# Patient Record
Sex: FEMALE | Race: WHITE | NOT HISPANIC OR LATINO | Employment: OTHER | URBAN - METROPOLITAN AREA
[De-identification: names, ages, dates, MRNs, and addresses within clinical notes are randomized per-mention and may not be internally consistent; named-entity substitution may affect disease eponyms.]

---

## 2023-03-14 ENCOUNTER — HOSPITAL ENCOUNTER (INPATIENT)
Facility: HOSPITAL | Age: 78
LOS: 2 days | Discharge: HOME | End: 2023-03-16
Attending: EMERGENCY MEDICINE | Admitting: EMERGENCY MEDICINE

## 2023-03-14 ENCOUNTER — HOSPITAL ENCOUNTER (EMERGENCY)
Facility: HOSPITAL | Age: 78
End: 2023-03-14
Attending: EMERGENCY MEDICINE | Admitting: EMERGENCY MEDICINE

## 2023-03-14 VITALS
WEIGHT: 150 LBS | TEMPERATURE: 97.4 F | OXYGEN SATURATION: 97 % | DIASTOLIC BLOOD PRESSURE: 84 MMHG | SYSTOLIC BLOOD PRESSURE: 145 MMHG | HEIGHT: 65 IN | HEART RATE: 116 BPM | BODY MASS INDEX: 24.99 KG/M2 | RESPIRATION RATE: 21 BRPM

## 2023-03-14 DIAGNOSIS — F10.20 ALCOHOL USE DISORDER, SEVERE, DEPENDENCE (HCC): ICD-10-CM

## 2023-03-14 DIAGNOSIS — F10.20 ALCOHOL DEPENDENCE (HCC): Primary | ICD-10-CM

## 2023-03-14 PROBLEM — F10.939 ALCOHOL WITHDRAWAL SYNDROME WITH COMPLICATION (HCC): Status: ACTIVE | Noted: 2023-03-14

## 2023-03-14 PROBLEM — R26.2 AMBULATORY DYSFUNCTION: Status: ACTIVE | Noted: 2023-03-14

## 2023-03-14 PROBLEM — F41.9 ANXIETY AND DEPRESSION: Status: ACTIVE | Noted: 2023-03-14

## 2023-03-14 PROBLEM — M81.0 OSTEOPOROSIS: Status: ACTIVE | Noted: 2023-03-14

## 2023-03-14 PROBLEM — F32.A ANXIETY AND DEPRESSION: Status: ACTIVE | Noted: 2023-03-14

## 2023-03-14 PROBLEM — M47.816 DEGENERATIVE JOINT DISEASE (DJD) OF LUMBAR SPINE: Status: ACTIVE | Noted: 2023-03-14

## 2023-03-14 PROBLEM — R74.01 TRANSAMINITIS: Status: ACTIVE | Noted: 2023-03-14

## 2023-03-14 PROBLEM — I10 HYPERTENSION: Status: ACTIVE | Noted: 2023-03-14

## 2023-03-14 PROBLEM — K21.9 GASTROESOPHAGEAL REFLUX DISEASE WITHOUT ESOPHAGITIS: Status: ACTIVE | Noted: 2023-03-14

## 2023-03-14 LAB
ALBUMIN SERPL BCP-MCNC: 4.8 G/DL (ref 3.5–5)
ALP SERPL-CCNC: 102 U/L (ref 34–104)
ALT SERPL W P-5'-P-CCNC: 40 U/L (ref 7–52)
ANION GAP SERPL CALCULATED.3IONS-SCNC: 15 MMOL/L (ref 4–13)
AST SERPL W P-5'-P-CCNC: 49 U/L (ref 13–39)
BASOPHILS # BLD AUTO: 0.06 THOUSANDS/ÂΜL (ref 0–0.1)
BASOPHILS NFR BLD AUTO: 1 % (ref 0–1)
BILIRUB SERPL-MCNC: 1.96 MG/DL (ref 0.2–1)
BUN SERPL-MCNC: 14 MG/DL (ref 5–25)
CALCIUM SERPL-MCNC: 9.3 MG/DL (ref 8.4–10.2)
CHLORIDE SERPL-SCNC: 101 MMOL/L (ref 96–108)
CK SERPL-CCNC: 392 U/L (ref 26–192)
CO2 SERPL-SCNC: 22 MMOL/L (ref 21–32)
CREAT SERPL-MCNC: 0.67 MG/DL (ref 0.6–1.3)
EOSINOPHIL # BLD AUTO: 0 THOUSAND/ÂΜL (ref 0–0.61)
EOSINOPHIL NFR BLD AUTO: 0 % (ref 0–6)
ERYTHROCYTE [DISTWIDTH] IN BLOOD BY AUTOMATED COUNT: 13.2 % (ref 11.6–15.1)
ETHANOL SERPL-MCNC: <10 MG/DL
GFR SERPL CREATININE-BSD FRML MDRD: 86 ML/MIN/1.73SQ M
GLUCOSE SERPL-MCNC: 135 MG/DL (ref 65–140)
HCT VFR BLD AUTO: 44.4 % (ref 34.8–46.1)
HGB BLD-MCNC: 14.9 G/DL (ref 11.5–15.4)
IMM GRANULOCYTES # BLD AUTO: 0.02 THOUSAND/UL (ref 0–0.2)
IMM GRANULOCYTES NFR BLD AUTO: 0 % (ref 0–2)
LYMPHOCYTES # BLD AUTO: 0.98 THOUSANDS/ÂΜL (ref 0.6–4.47)
LYMPHOCYTES NFR BLD AUTO: 14 % (ref 14–44)
MCH RBC QN AUTO: 32.8 PG (ref 26.8–34.3)
MCHC RBC AUTO-ENTMCNC: 33.6 G/DL (ref 31.4–37.4)
MCV RBC AUTO: 98 FL (ref 82–98)
MONOCYTES # BLD AUTO: 0.71 THOUSAND/ÂΜL (ref 0.17–1.22)
MONOCYTES NFR BLD AUTO: 10 % (ref 4–12)
NEUTROPHILS # BLD AUTO: 5.25 THOUSANDS/ÂΜL (ref 1.85–7.62)
NEUTS SEG NFR BLD AUTO: 75 % (ref 43–75)
NRBC BLD AUTO-RTO: 0 /100 WBCS
PLATELET # BLD AUTO: 221 THOUSANDS/UL (ref 149–390)
PMV BLD AUTO: 10.4 FL (ref 8.9–12.7)
POTASSIUM SERPL-SCNC: 3.8 MMOL/L (ref 3.5–5.3)
PROT SERPL-MCNC: 7.8 G/DL (ref 6.4–8.4)
RBC # BLD AUTO: 4.54 MILLION/UL (ref 3.81–5.12)
SODIUM SERPL-SCNC: 138 MMOL/L (ref 135–147)
WBC # BLD AUTO: 7.02 THOUSAND/UL (ref 4.31–10.16)

## 2023-03-14 RX ORDER — PAROXETINE HYDROCHLORIDE 20 MG/1
1 TABLET, FILM COATED ORAL DAILY
COMMUNITY
Start: 2023-02-14

## 2023-03-14 RX ORDER — PAROXETINE HYDROCHLORIDE 20 MG/1
20 TABLET, FILM COATED ORAL DAILY
Status: DISCONTINUED | OUTPATIENT
Start: 2023-03-15 | End: 2023-03-16 | Stop reason: HOSPADM

## 2023-03-14 RX ORDER — LANOLIN ALCOHOL/MO/W.PET/CERES
1 CREAM (GRAM) TOPICAL
Status: DISCONTINUED | OUTPATIENT
Start: 2023-03-15 | End: 2023-03-16 | Stop reason: HOSPADM

## 2023-03-14 RX ORDER — ENOXAPARIN SODIUM 100 MG/ML
40 INJECTION SUBCUTANEOUS DAILY
Status: DISCONTINUED | OUTPATIENT
Start: 2023-03-15 | End: 2023-03-16 | Stop reason: HOSPADM

## 2023-03-14 RX ORDER — FOLIC ACID 1 MG/1
1 TABLET ORAL DAILY
Status: CANCELLED | OUTPATIENT
Start: 2023-03-14

## 2023-03-14 RX ORDER — ROMOSOZUMAB-AQQG 105 MG/1.17ML
210 INJECTION, SOLUTION SUBCUTANEOUS
COMMUNITY
Start: 2023-01-27 | End: 2023-03-16

## 2023-03-14 RX ORDER — TRAZODONE HYDROCHLORIDE 50 MG/1
50 TABLET ORAL
Status: DISCONTINUED | OUTPATIENT
Start: 2023-03-14 | End: 2023-03-16 | Stop reason: HOSPADM

## 2023-03-14 RX ORDER — FOLIC ACID 1 MG/1
1 TABLET ORAL DAILY
Status: DISCONTINUED | OUTPATIENT
Start: 2023-03-15 | End: 2023-03-14

## 2023-03-14 RX ORDER — ONDANSETRON 2 MG/ML
4 INJECTION INTRAMUSCULAR; INTRAVENOUS EVERY 6 HOURS PRN
Status: DISCONTINUED | OUTPATIENT
Start: 2023-03-14 | End: 2023-03-16 | Stop reason: HOSPADM

## 2023-03-14 RX ORDER — SODIUM CHLORIDE 9 MG/ML
100 INJECTION, SOLUTION INTRAVENOUS CONTINUOUS
Status: DISCONTINUED | OUTPATIENT
Start: 2023-03-14 | End: 2023-03-15

## 2023-03-14 RX ORDER — AMLODIPINE BESYLATE 5 MG/1
5 TABLET ORAL DAILY
COMMUNITY
Start: 2022-09-26

## 2023-03-14 RX ORDER — SODIUM CHLORIDE 9 MG/ML
100 INJECTION, SOLUTION INTRAVENOUS CONTINUOUS
Status: CANCELLED | OUTPATIENT
Start: 2023-03-14

## 2023-03-14 RX ORDER — CHLORDIAZEPOXIDE HYDROCHLORIDE 25 MG/1
50 CAPSULE, GELATIN COATED ORAL ONCE
Status: COMPLETED | OUTPATIENT
Start: 2023-03-14 | End: 2023-03-14

## 2023-03-14 RX ORDER — EPINEPHRINE 1 MG/ML
0.3 INJECTION, SOLUTION, CONCENTRATE INTRAVENOUS
COMMUNITY
Start: 2023-01-27 | End: 2023-03-16

## 2023-03-14 RX ORDER — B-COMPLEX WITH VITAMIN C
1 TABLET ORAL 2 TIMES DAILY
COMMUNITY
Start: 2022-12-14

## 2023-03-14 RX ORDER — ACETAMINOPHEN 325 MG/1
650 TABLET ORAL EVERY 6 HOURS PRN
Status: DISCONTINUED | OUTPATIENT
Start: 2023-03-14 | End: 2023-03-16 | Stop reason: HOSPADM

## 2023-03-14 RX ORDER — LANOLIN ALCOHOL/MO/W.PET/CERES
100 CREAM (GRAM) TOPICAL DAILY
Status: CANCELLED | OUTPATIENT
Start: 2023-03-14

## 2023-03-14 RX ORDER — GABAPENTIN 100 MG/1
100 CAPSULE ORAL 3 TIMES DAILY
Status: DISCONTINUED | OUTPATIENT
Start: 2023-03-14 | End: 2023-03-16 | Stop reason: HOSPADM

## 2023-03-14 RX ORDER — LORAZEPAM 2 MG/ML
2 INJECTION INTRAMUSCULAR ONCE
Status: COMPLETED | OUTPATIENT
Start: 2023-03-14 | End: 2023-03-14

## 2023-03-14 RX ORDER — TRAZODONE HYDROCHLORIDE 50 MG/1
50 TABLET ORAL
Status: CANCELLED | OUTPATIENT
Start: 2023-03-14

## 2023-03-14 RX ORDER — LANOLIN ALCOHOL/MO/W.PET/CERES
100 CREAM (GRAM) TOPICAL DAILY
Status: DISCONTINUED | OUTPATIENT
Start: 2023-03-15 | End: 2023-03-14

## 2023-03-14 RX ORDER — LISINOPRIL 30 MG/1
30 TABLET ORAL DAILY
COMMUNITY

## 2023-03-14 RX ORDER — ONDANSETRON 2 MG/ML
4 INJECTION INTRAMUSCULAR; INTRAVENOUS EVERY 6 HOURS PRN
Status: CANCELLED | OUTPATIENT
Start: 2023-03-14

## 2023-03-14 RX ORDER — DIAZEPAM 5 MG/ML
10 INJECTION, SOLUTION INTRAMUSCULAR; INTRAVENOUS ONCE
Status: DISCONTINUED | OUTPATIENT
Start: 2023-03-14 | End: 2023-03-16 | Stop reason: HOSPADM

## 2023-03-14 RX ORDER — ENOXAPARIN SODIUM 100 MG/ML
40 INJECTION SUBCUTANEOUS DAILY
Status: CANCELLED | OUTPATIENT
Start: 2023-03-14

## 2023-03-14 RX ORDER — ONDANSETRON 2 MG/ML
4 INJECTION INTRAMUSCULAR; INTRAVENOUS ONCE
Status: COMPLETED | OUTPATIENT
Start: 2023-03-14 | End: 2023-03-14

## 2023-03-14 RX ORDER — MELOXICAM 7.5 MG/1
7.5 TABLET ORAL DAILY
COMMUNITY

## 2023-03-14 RX ORDER — LORAZEPAM 2 MG/ML
1 INJECTION INTRAMUSCULAR EVERY 2 HOUR PRN
Status: DISCONTINUED | OUTPATIENT
Start: 2023-03-14 | End: 2023-03-14 | Stop reason: HOSPADM

## 2023-03-14 RX ORDER — ACETAMINOPHEN 325 MG/1
650 TABLET ORAL EVERY 6 HOURS PRN
Status: CANCELLED | OUTPATIENT
Start: 2023-03-14

## 2023-03-14 RX ORDER — GABAPENTIN 100 MG/1
100 CAPSULE ORAL 3 TIMES DAILY
COMMUNITY

## 2023-03-14 RX ADMIN — Medication 650 MG: at 17:40

## 2023-03-14 RX ADMIN — CHLORDIAZEPOXIDE HYDROCHLORIDE 50 MG: 25 CAPSULE ORAL at 11:52

## 2023-03-14 RX ADMIN — SODIUM CHLORIDE 1000 ML: 0.9 INJECTION, SOLUTION INTRAVENOUS at 11:51

## 2023-03-14 RX ADMIN — ONDANSETRON 4 MG: 2 INJECTION INTRAMUSCULAR; INTRAVENOUS at 11:51

## 2023-03-14 RX ADMIN — LORAZEPAM 2 MG: 2 INJECTION INTRAMUSCULAR; INTRAVENOUS at 11:52

## 2023-03-14 RX ADMIN — GABAPENTIN 100 MG: 100 CAPSULE ORAL at 17:29

## 2023-03-14 RX ADMIN — FOLIC ACID 1 MG: 5 INJECTION, SOLUTION INTRAMUSCULAR; INTRAVENOUS; SUBCUTANEOUS at 18:19

## 2023-03-14 RX ADMIN — LORAZEPAM 1 MG: 2 INJECTION INTRAMUSCULAR; INTRAVENOUS at 15:39

## 2023-03-14 RX ADMIN — SODIUM CHLORIDE 100 ML/HR: 0.9 INJECTION, SOLUTION INTRAVENOUS at 17:29

## 2023-03-14 RX ADMIN — THIAMINE HYDROCHLORIDE 500 MG: 100 INJECTION, SOLUTION INTRAMUSCULAR; INTRAVENOUS at 18:40

## 2023-03-14 NOTE — ASSESSMENT & PLAN NOTE
· Hx of lumbar DJD  · Continue at home treatment of gabapentin 100mg TID and daily calcium supplementation   · Lidocaine patch prn   · Tylenol for pain

## 2023-03-14 NOTE — ASSESSMENT & PLAN NOTE
Recent Labs     03/14/23  1150 03/15/23  0526   AST 49* 34   ALT 40 29   TBILI 1 96* 1 93*     · Patient with mildly elevated transaminases POA  · In the setting of chronic alcohol use    · Encourage cessation  · IV hydration  · Continue to monitor

## 2023-03-14 NOTE — ASSESSMENT & PLAN NOTE
· Patient receive calcium supplementation daily along with monthly romosozumab-appg (Evenity) SQ injections for osteoporosis  · Last injection 3/8/23    · Continue home calcium supplementation

## 2023-03-14 NOTE — ASSESSMENT & PLAN NOTE
· Hx of chronic hypertension   · At home regimen of Lisinopril 30 mg daily and Amlodopine 5 mg   · Current BP:154/88  · Resume anti-hypertensive medication upon discharge  · Recommend follow up with PCP upon discharge

## 2023-03-14 NOTE — PLAN OF CARE
Problem: SUBSTANCE USE/ABUSE  Goal: By discharge, will develop insight into their chemical dependency and sustain motivation to continue in recovery  Description: INTERVENTIONS:  - Attends all daily group sessions and scheduled AA groups  - Actively practices coping skills through participation in the therapeutic community and adherence to program rules  - Reviews and completes assignments from individual treatment plan  - Assist patient development of understanding of their personal cycle of addiction and relapse triggers  Outcome: Progressing  Goal: By discharge, patient will have ongoing treatment plan addressing chemical dependency  Description: INTERVENTIONS:  - Assist patient with resources and/or appointments for ongoing recovery based living  Outcome: Progressing     Problem: COPING  Goal: Pt/Family able to verbalize concerns and demonstrate effective coping strategies  Description: INTERVENTIONS:  - Assist patient/family to identify coping skills, available support systems and cultural and spiritual values  - Provide emotional support, including active listening and acknowledgement of concerns of patient and caregivers  - Reduce environmental stimuli, as able  - Provide patient education  - Assess for spiritual pain/suffering and initiate spiritual care, including notification of Pastoral Care or komal based community as needed  - Assess effectiveness of coping strategies  Outcome: Progressing  Goal: Will report anxiety at manageable levels  Description: INTERVENTIONS:  - Administer medication as ordered  - Teach and encourage coping skills  - Provide emotional support  - Assess patient/family for anxiety and ability to cope  Outcome: Progressing

## 2023-03-14 NOTE — EMTALA/ACUTE CARE TRANSFER
[address] [address]  Dept: [phone]      EMTALA TRANSFER CONSENT    NAME Trenton Shell                                                                       MRN [id number]    I have been informed of my rights regarding examination, treatment, and transfer   by Dr Tiffanie Cortez DO    Benefits: Specialized equipment and/or services available at the receiving facility (Include comment)________________________, Continuity of care    Risks: Potential deterioration of medical condition, Loss of IV, Increased discomfort during transfer, Potential for delay in receiving treatment      Consent for Transfer:  I acknowledge that my medical condition has been evaluated and explained to me by the emergency department physician or other qualified medical person and/or my attending physician, who has recommended that I be transferred to the service of  Accepting Physician: Dr Latrell Lee at [address] : [facility]  The above potential benefits of such transfer, the potential risks associated with such transfer, and the probable risks of not being transferred have been explained to me, and I fully understand them  The doctor has explained that, in my case, the benefits of transfer outweigh the risks  I agree to be transferred  I authorize the performance of emergency medical procedures and treatments upon me in both transit and upon arrival at the receiving facility  Additionally, I authorize the release of any and all medical records to the receiving facility and request they be transported with me, if possible  I understand that the safest mode of transportation during a medical emergency is an ambulance and that the Hospital advocates the use of this mode of transport   Risks of traveling to the receiving facility by car, including absence of medical control, life sustaining equipment, such as oxygen, and medical personnel has been explained to me and I fully understand them  (CECILIA CORRECT BOX BELOW)  [  ]  I consent to the stated transfer and to be transported by ambulance/helicopter  [  ]  I consent to the stated transfer, but refuse transportation by ambulance and accept full responsibility for my transportation by car  I understand the risks of non-ambulance transfers and I exonerate the Hospital and its staff from any deterioration in my condition that results from this refusal     X___________________________________________    DATE  23  TIME________  Signature of patient or legally responsible individual signing on patient behalf           RELATIONSHIP TO PATIENT_________________________          Provider Certification    NAME Praful Guevara                                         1945                              MRN [id number]    A medical screening exam was performed on the above named patient  Based on the examination:    Condition Necessitating Transfer The encounter diagnosis was Alcohol dependence ([address] )  Patient Condition: The patient has been stabilized such that within reasonable medical probability, no material deterioration of the patient condition or the condition of the unborn child(julia) is likely to result from the transfer    Reason for Transfer: Level of Care needed not available at this facility    Transfer Requirements:  [address]   · Space available and qualified personnel available for treatment as acknowledged by    · Agreed to accept transfer and to provide appropriate medical treatment as acknowledged by       Dr Renetta Mcqueen  · Appropriate medical records of the examination and treatment of the patient are provided at the time of transfer   [address] _______  · Transfer will be performed by qualified personnel from    and appropriate transfer equipment as required, including the use of necessary and appropriate life support measures      Provider Certification: I have examined the patient and explained the following risks and benefits of being transferred/refusing transfer to the patient/family:  General risk, such as traffic hazards, adverse weather conditions, rough terrain or turbulence, possible failure of equipment (including vehicle or aircraft), or consequences of actions of persons outside the control of the transport personnel, The possibility of a transport vehicle being unavailable      Based on these reasonable risks and benefits to the patient and/or the unborn child(julia), and based upon the information available at the time of the patient’s examination, I certify that the medical benefits reasonably to be expected from the provision of appropriate medical treatments at another medical facility outweigh the increasing risks, if any, to the individual’s medical condition, and in the case of labor to the unborn child, from effecting the transfer      X____________________________________________ DATE 03/14/23        TIME_______      ORIGINAL - SEND TO MEDICAL RECORDS   COPY - SEND WITH PATIENT DURING TRANSFER

## 2023-03-14 NOTE — ED NOTES
Patient denies seizures with alcohol withdrawal  Last drink was yesterday around 9pm on 3/13      Richard Snell RN  03/14/23 [id number]

## 2023-03-14 NOTE — ASSESSMENT & PLAN NOTE
· Patient endorses a h/o chronic anxiety and depression  · Home medications include paxil 20 mg   · Reports compliance with home medications   · Denies SI/HI/thoughts of self harm  · Re-start home medications  · Recommend OP f/u with PCP/OP Psychiatry

## 2023-03-14 NOTE — H&P
HISTORY & PHYSICAL EXAM  DEPARTMENT OF MEDICAL TOXICOLOGY  LEVEL 4 MEDICAL DETOX UNIT  Yousuf Tariq 78 y o  female MRN: [id number]  Unit/Bed#: 5T [facility] 508-01 Encounter: [id number]      Reason for Admission/Principal Problem: Ethanol withdrawal, Ethanol use disorder  Admitting Provider: Maribel Prieto PA-C  Attending Provider: Pa Alfred MD   3/14/2023  5:00 PM        * Alcohol withdrawal syndrome with complication [facility])  Assessment & Plan  · H/o chronic daily alcohol consumption, denies h/o withdrawal seizures  · Last drink: 3/13/23 @ 9 pm   · Ethanol lvl: 3/14/23 <3  · Follow SEWS protocol with symptom-triggered phenobarbital for medically-assisted alcohol withdrawal  · Current symptoms include anxiety, impulsiveness, hypertensive, tachycardic, and tremulous   · SEWS score upon admission- 13  · Administer initial dose of 650 mg phenobarbital  · Initiate telemetry and pulse oximetry monitoring   · Continue to monitor vitals, symptoms         Alcohol use disorder, severe, dependence ([facility] [address] )  Assessment & Plan  · Patient reports chronic alcohol use for the last 5 years   · Notes periods of binging, most recently drinking 1 pint of gustavo daily for the last few weeks  · Last drink in the evening of 3/13  · Denies any previous withdrawal or treatment for alcohol use  · Patient initially presented to outpatient appointment for alcohol use when they recommended she present to ED for detox  · Interested in outpatient on discharge    · Case management for assistance in discharge planning   · See Alcohol Withdrawal    Ambulatory dysfunction  Assessment & Plan  · Patient shaky on feet at time of admission   · Able to walk a few steps from the stretcher to the hospital bed   · Denies assisted walking devices at home or recent falls     · High dose thiamine regimen initiated   · PT consulted     Transaminitis  Assessment & Plan  Recent Labs     03/14/23  1150   AST 49*   ALT 40   TBILI 1 96*     · Patient with mildly elevated transaminases POA  · In the setting of chronic alcohol use    · Encourage cessation  · IV hydration  · Continue to monitor    Anxiety and depression  Assessment & Plan  · Patient endorses a h/o chronic anxiety and depression  · Home medications include paxil 20 mg   · Reports compliance with home medications   · Denies SI/HI/thoughts of self harm  · Re-start home medications  · Recommend OP f/u with PCP/OP Psychiatry      Hypertension  Assessment & Plan  · Hx of chronic hypertension   · At home regimen of Lisinopril 30 mg daily and Amlodopine 5 mg   · Current BP:  · Plan to hold anti-hypertensive medication while on protocol; resume when off of protocol  · Continue to monitor BP    Osteoporosis  Assessment & Plan  · Patient receive calcium supplementation daily along with monthly romosozumab-appg (Evenity) SQ injections for osteoporosis  · Last injection 3/8/23    · Continue home calcium supplementation    Degenerative joint disease (DJD) of lumbar spine  Assessment & Plan  · Hx of lumbar DJD  · Continue at home treatment of gabapentin 100mg TID and daily calcium supplementation   · Lidocaine patch prn   · Tylenol for pain     Gastroesophageal reflux disease without esophagitis  Assessment & Plan  · Continue PPI              VTE Prophylaxis: Enoxaparin (Lovenox)  / sequential compression device   Code Status: Full Code      Anticipated Length of Stay:  Patient will be admitted on an Inpatient basis with an anticipated length of stay of  2-3  midnights  Justification for Hospital Stay: Alcohol withdrawal    For any questions or concerns, please Tiger Text the advanced practitioner in the role of [facility]-DETOX-AP On Call      This patient qualifies for Level IV medically managed intensive inpatient services under the criteria set by the American Society of Addiction Medicine, including dimensions 1-3   The patient is in withdrawal (or is intoxicated with high risk of withdrawal), with severe and unstable medical and/or psychiatric (dual diagnosis) problems, requiring requires 24-hour medical and nursing care and the full resources of a [address] patient to medical detox unit and continue supportive care and stabilization of acute ethanol withdrawal per medical toxicology/detox treatment pathway  Monitor ethanol withdrawal severity via the Severity of Ethanol Withdrawal Scale (SEWS) Q4 hours and then hourly if/when SEWS > 6  Treat withdrawal per pathway and reassess Q30-60 minutes  Mild SEWS Score 1-6  Administer medications* (IV or PO; PO preferred):  • If initial SEWS score: diazepam 10mg PO/IV x 1 AND phenobarbital 65 mg PO/IV x 1  • If repeat SEWS score 1-6: phenobarbital 65 mg PO/IV q1 hour x 5 doses maximum   Reassessment:   • SEWS q1 hour after each dose until SEWS 0 x 2 hours  • VS q1 hours (until SEWS 0, then q4 hours)  • Notify provider for bedside evaluation if 5-dose maximum is reached, RASS of -3 to -5, or SEWS score escalates to moderate or severe  Moderate SEWS Score 7-12  Administer medications* (IV):  • If initial SEWS score: diazepam 10mg IV x 1 AND phenobarbital 260 mg IV x 1  • If repeat SEWS score 7-12 or score escalated from mild: phenobarbital 130 mg IV q30 minutes x 5 doses maximum   Reassessment:  • SEWS q30 minutes after each dose until SEWS < 7 (then hourly until SEWS 0 x 2 hours)  • VS q30 minutes until SEWS < 7 (then hourly until SEWS 0, then q4 hours)  • Notify provider for bedside evaluation if 5-dose maximum is reached, RASS of -3 to -5, or SEWS score escalates to severe  Severe SEWS Score ? 13  Administer medications* (IV):  • If initial SEWS score: Diazepam 10 mg IV x 1 AND phenobarbital 650 mg IV piggyback x 1 over 15-30 minutes  • If repeat SEWS score ?  13 or score escalated from mild or moderate: phenobarbital 130 mg IV q30 minutes x 5 doses maximum   Reassessment:  • SEWS q30 minutes after each dose until SEWS < 7 (then hourly until SEWS 0 x 2 hours)   • VS q30 minutes until SEWS < 7 (then hourly until SEWS 0, then q4 hours)  • Notify provider for bedside evaluation if 5-dose maximum is reached or RASS of -3 to -5   *Hold medications and notify provider if CNS depression, respirations < 10/min, or RASS of -3 to -5  Medications to be administered adjunctively if more than 2 grams of phenobarbital is needed for stabilization of withdrawal; require attending approval    • Dexmedetomidine infusion 0 1-1mcg/kg/hr IV infusion, titratable to reduced agitation (Goal: RASS -2)  • Ketamine   o Acute agitated delirium: 1-2 mg/kg IV or 4-5 mg/kg IM  o Refractory withdrawal: 0 1-1mg/kg/hr IV infusion, titratable to reduced agitation (Goal: RASS -2)    Further evaluation, screening and treatment:  Evaluate complete metabolic panel, transaminases, INR, and lipase  Assess hepatic ultrasound for any sign of alcoholic liver disease or cirrhosis, and ultimately refer for further hepatic evaluation and care as/if indicated  Additional medications for ethanol associated malnutrition: Thiamine 100 mg IV daily, increase to 500 mg TID for signs/symptoms of Wernicke's Encephalopathy or Wernicke Korsakoff Syndrome   Folic acid 1 mg IV daily   Multivitamin PO daily      Will offer first monthly injection of Naltrexone 380 mg IM, once patient is stabilized, as it has been shown to assist in decreasing cravings for ethanol  Evaluate and treat for coexisting substance use, such as opioids and nicotine  Discuss risk factors for infectious disease, such as history of intravenous drug abuse, and offer hepatitis and HIV screening if indicated  Case management consultation to assist with coordination of subsequent treatment after discharge  Hx and PE limited by: None, patient alert and cooperative    HPI: Jesi Caputo is a 78y o  year old female who presented to [place] ED requesting alcohol detoxification  Patient reported presenting to an outpatient office for alcohol use this afternoon and was recommended to present to the ED for detox by the provider  She reports drinking 1 pint of gustavo daily for the last few weeks with last drink the evening of 3/13  Denies any previous withdrawal or alcohol use treatment  Patient has past medical history of hypertension, osteoporosis, depression, and GERD which she reports medication compliance for  Patient was admitted to [address] [place] [address] detox unit for alcohol detoxification  She was started on SEWS protocol with symptom triggered phenobarbital along with symptomatic management of withdrawal  Patient received an initial dose of 650 mg phenobarbital   Patient currently endorsing anxiety, agitation, tremor, nausea, tachycardia, and hypertension  Patient reports interested in outpatient follow-up once discharged  Preferred alcoholic beverage(s): Gustavo  Quantity and frequency of alcohol intake: 1 pint daily  Use of any ethanol substitutes (toxic alcohols): no  Date/Time of last alcohol intake: Evening of 3/13  Current signs and symptoms of ethanol withdrawal: anxiety, tremor, tachycardia, hypertension and nausea    SEWS Total Score: 13 (3/14/2023  5:21 PM)        Ethanol Withdrawal History  Previous ethanol withdrawal? no  Prior inpatient treatment for ethanol withdrawal? no  Prior outpatient treatment for ethanol withdrawal? no  History of seizures with prior ethanol withdrawal? no  Prior treatment with naltrexone (Vivitrol)? no  Current treatment with naltrexone (Vivitrol)? no  Other current treatment for ethanol use disorder?  no  Co-existing substance use? no    Review of PDMP: yes, no prescriptions    Social History     Substance and Sexual Activity   Alcohol Use Yes    Comment: gustavo - 1 pint daily     Social History     Substance and Sexual Activity   Drug Use Not Currently     Social History     Tobacco Use   Smoking Status Never   Smokeless Tobacco Never       Review of Systems   Constitutional: Negative for chills, diaphoresis and fever  HENT: Negative for congestion and rhinorrhea  Respiratory: Negative for cough, chest tightness and shortness of breath  Cardiovascular: Negative for chest pain and palpitations  Gastrointestinal: Positive for nausea and vomiting  Negative for abdominal pain, constipation and diarrhea  Genitourinary: Negative for difficulty urinating  Musculoskeletal: Negative for arthralgias and gait problem  Neurological: Positive for tremors  Negative for dizziness, seizures and headaches  Psychiatric/Behavioral: Positive for agitation  Negative for confusion, hallucinations, self-injury and suicidal ideas  The patient is nervous/anxious  Historical Information   Past Medical History:   Diagnosis Date   • Basal cell carcinoma    • Chronic pain of both lower extremities    • Fatty liver    • GERD (gastroesophageal reflux disease)    • High cholesterol    • Hypertension    • Liver cyst    • Osteoporosis    • Psychiatric disorder     depression & anxiety   • Renal cyst    • Spondylolisthesis of lumbar region      Past Surgical History:   Procedure Laterality Date   • APPENDECTOMY     • CARPAL TUNNEL RELEASE     • CHOLECYSTECTOMY     • DILATION AND CURETTAGE OF UTERUS     • EGD AND COLONOSCOPY       No family history on file  Social History   Marital Status:    Occupation: Retired hairdresser  Patient Pre-hospital Living Situation: Stable, lives independently in an apartment  Patient Pre-hospital Level of Mobility: Independent  Patient Pre-hospital Diet Restrictions: None, has removable dentures    No Known Allergies    Prior to Admission medications    Medication Sig Start Date End Date Taking?  Authorizing Provider   amLODIPine (NORVASC) 5 mg tablet Take 5 mg by mouth daily 9/26/22   Historical Provider, MD   calcium carbonate-vitamin D 500 mg-5 mcg per tablet Take 1 tablet by mouth 2 (two) times a day 12/14/22   Historical Provider, MD   Cholecalciferol 25 MCG (1000 UT) tablet Take by mouth    Historical Provider, MD   EPINEPHrine PF (ADRENALIN) 1 mg/mL Inject 0 3 mg into a muscle 1/27/23   Historical Provider, MD   gabapentin (NEURONTIN) 100 mg capsule Take 100 mg by mouth 3 (three) times a day    Historical Provider, MD   lisinopril (ZESTRIL) 30 mg tablet Take 30 mg by mouth daily    Historical Provider, MD   meloxicam (MOBIC) 7 5 mg tablet Take 7 5 mg by mouth daily    Historical Provider, MD   PARoxetine (PAXIL) 20 mg tablet Take 1 tablet by mouth daily 2/14/23   Historical Provider, MD   romosozumab-aqqg (Evenity) subcutaneous injection Inject 210 mg under the skin 1/27/23   Historical Provider, MD       Current Facility-Administered Medications   Medication Dose Route Frequency   • acetaminophen (TYLENOL) tablet 650 mg  650 mg Oral Q6H PRN   • [START ON 3/15/2023] calcium carbonate-vitamin D 500 mg-5 mcg tablet 1 tablet  1 tablet Oral Daily With Breakfast   • diazepam (VALIUM) injection 10 mg  10 mg Intravenous Once   • [START ON 3/15/2023] enoxaparin (LOVENOX) subcutaneous injection 40 mg  40 mg Subcutaneous Daily   • folic acid 1 mg in sodium chloride 0 9 % 50 mL IVPB  1 mg Intravenous Daily   • gabapentin (NEURONTIN) capsule 100 mg  100 mg Oral TID   • [START ON 3/15/2023] multivitamin-minerals (CENTRUM) tablet 1 tablet  1 tablet Oral Daily   • ondansetron (ZOFRAN) injection 4 mg  4 mg Intravenous Q6H PRN   • [START ON 3/15/2023] PARoxetine (PAXIL) tablet 20 mg  20 mg Oral Daily   • phenobarbital in sodium chloride 0 9% 650 mg  650 mg Intravenous Once   • sodium chloride 0 9 % infusion  100 mL/hr Intravenous Continuous   • thiamine (VITAMIN B1) 500 mg in sodium chloride 0 9 % 50 mL IVPB  500 mg Intravenous Q8H   • traZODone (DESYREL) tablet 50 mg  50 mg Oral HS PRN       Continuous Infusions:sodium chloride, 100 mL/hr, Last Rate: 100 mL/hr (03/14/23 7182)             Objective     No intake or output data in the 24 hours ending 03/14/23 1801    Invasive Devices:   Peripheral IV 03/14/23 Dorsal (posterior); Left Forearm (Active)       Vitals   Vitals:    03/14/23 1716   BP: 157/92   TempSrc: Temporal   Pulse: (!) 110   Resp: 22   Patient Position - Orthostatic VS: Sitting   Temp: 98 9 °F (37 2 °C)       Physical Exam  Constitutional:       Appearance: She is ill-appearing  Eyes:      Pupils: Pupils are equal, round, and reactive to light  Cardiovascular:      Rate and Rhythm: Regular rhythm  Tachycardia present  Pulses: Normal pulses  Heart sounds: Normal heart sounds  No murmur heard  No gallop  Pulmonary:      Effort: Pulmonary effort is normal  No respiratory distress  Breath sounds: Normal breath sounds  No wheezing or rales  Abdominal:      General: Abdomen is flat  Bowel sounds are normal  There is no distension  Palpations: Abdomen is soft  Tenderness: There is no abdominal tenderness  There is no guarding  Musculoskeletal:         General: Normal range of motion  Cervical back: Normal range of motion  Skin:     General: Skin is warm and dry  Capillary Refill: Capillary refill takes less than 2 seconds  Neurological:      Mental Status: She is alert and oriented to person, place, and time  Motor: Tremor present  Coordination: Finger-Nose-Finger Test normal       Gait: Gait abnormal    Psychiatric:         Mood and Affect: Mood is anxious  Speech: Speech normal          Behavior: Behavior is cooperative  Thought Content: Thought content normal          Data:    EKG, Pathology, and Other Studies: I have personally reviewed pertinent reports      EKG 3/14/23 1733  Read 1800     Rate- 99 bpm  CT interval- 146 ms  QRS duration- 84 ms  QT/QTC- 372/477 ms     Interpretation- normal sinus rhythm      Lab Results:  CBC ETOH     Lab Results   Component Value Date    WBC 7 02 03/14/2023    RBC 4 54 03/14/2023    HGB 14 9 03/14/2023    HCT 44 4 03/14/2023    MCV 98 03/14/2023    MCH 32 8 03/14/2023    MCHC 33 6 03/14/2023    RDW 13 2 03/14/2023     03/14/2023    MPV 10 4 03/14/2023      No results found for: LACTICACID   CMP UA         Component Value Date/Time    K 3 8 03/14/2023 1150     03/14/2023 1150    CO2 22 03/14/2023 1150    BUN 14 03/14/2023 1150    CREATININE 0 67 03/14/2023 1150         Component Value Date/Time    CALCIUM 9 3 03/14/2023 1150    ALKPHOS 102 03/14/2023 1150    AST 49 (H) 03/14/2023 1150    ALT 40 03/14/2023 1150      No results found for: CLARITYU, COLORU, SPECGRAV, PHUR, GLUCOSEU, KETONESU, BLOODU, PROTEIN UA, NITRITE, BILIRUBINUR, UROBILINOGEN, LEUKOCYTESUR, WBCUA, RBCUA, HYALINE, BACTERIA, EPIS     Liver Function Test: ASA     Lab Results   Component Value Date    TBILI 1 96 (H) 03/14/2023    ALKPHOS 102 03/14/2023    AST 49 (H) 03/14/2023    ALT 40 03/14/2023    TP 7 8 03/14/2023    ALB 4 8 03/14/2023      No results found for: SALICYLATE   Troponin APAP     No results found for: TROPONINI   No results found for: ACTMNPHEN   VBG HCG     No results found for: PHVEN, GYO4DUK, PO2VEN, ZLW5OHJ, BEVEN, A0PXNVXZF, Q1MQMYG   No results found for: HCGQUANT   ABG Urine Drug Screen     No results found for: PHART, HAC2MWS, PO2ART, EEF0PTK, BEART, U2TVQGURY, O2HGB, SOURC, ELIAS, VTAC, ACRATE, INSPIREDAIR, PEEP   No results found for: AMPMETHUR, BARBTUR, BDZUR, COCAINEUR, METHADONEUR, OPIATEUR, PCPUR, THCUR, OXYCODONEUR   Lactate INR     No results found for: LACTICACID   No results found for: INR   PTT Protime     No results found for: PTT     No results found for: PROTIME           Imaging Studies: I have personally reviewed pertinent reports  Counseling / Coordination of Care  Total floor / unit time spent today 60 minutes  Greater than 50% of total time was spent with the patient and / or family counseling and / or coordination of care  ** Please Note: This note has been constructed using a voice recognition system   **

## 2023-03-14 NOTE — ASSESSMENT & PLAN NOTE
· Patient shaky on feet at time of admission    · Gait has improved upon admission with administration of thiamine supplementation  · PT evaluated patient- recommended home with home support  · At time of discharge patient was observed ambulating without any difficulty or gait abnormality

## 2023-03-14 NOTE — ED PROVIDER NOTES
History  Chief Complaint   Patient presents with   • Medical Problem     Patient encouraged to come in by daughter for alcohol detox program  Patient drinks about 1 pint of gustavo daily  Patient is now having vomiting and heart palpitations  22-year-old female presents to the ED brought by her daughter for alcohol addiction and withdrawal symptoms  Patient admits that she has been on a govea and drinking over the last several days  Patient states that she drinks about 1 pint of vodka daily  She states her last drink was last night  She has been having nausea vomiting diarrhea with heart palpitations  She denies any abdominal pain, chest pain or shortness of breath  Prior to Admission Medications   Prescriptions Last Dose Informant Patient Reported? Taking?    Cholecalciferol 25 MCG (1000 UT) tablet   Yes Yes   Sig: Take by mouth   EPINEPHrine PF (ADRENALIN) 1 mg/mL   Yes Yes   Sig: Inject 0 3 mg into a muscle   PARoxetine (PAXIL) 20 mg tablet   Yes Yes   Sig: Take 1 tablet by mouth daily   amLODIPine (NORVASC) 5 mg tablet   Yes Yes   Sig: Take 5 mg by mouth daily   calcium carbonate-vitamin D 500 mg-5 mcg per tablet   Yes Yes   Sig: Take 1 tablet by mouth 2 (two) times a day   gabapentin (NEURONTIN) 100 mg capsule   Yes Yes   Sig: Take 100 mg by mouth 3 (three) times a day   lisinopril (ZESTRIL) 30 mg tablet   Yes Yes   Sig: Take 30 mg by mouth daily   meloxicam (MOBIC) 7 5 mg tablet   Yes Yes   Sig: Take 7 5 mg by mouth daily   romosozumab-aqqg (Evenity) subcutaneous injection   Yes Yes   Sig: Inject 210 mg under the skin      Facility-Administered Medications: None       Past Medical History:   Diagnosis Date   • Basal cell carcinoma    • Chronic pain of both lower extremities    • Fatty liver    • GERD (gastroesophageal reflux disease)    • High cholesterol    • Hypertension    • Liver cyst    • Osteoporosis    • Psychiatric disorder     depression & anxiety   • Renal cyst    • Spondylolisthesis of lumbar region        Past Surgical History:   Procedure Laterality Date   • APPENDECTOMY     • CARPAL TUNNEL RELEASE     • CHOLECYSTECTOMY     • DILATION AND CURETTAGE OF UTERUS     • EGD AND COLONOSCOPY         History reviewed  No pertinent family history  I have reviewed and agree with the history as documented  E-Cigarette/Vaping   • E-Cigarette Use Never User      E-Cigarette/Vaping Substances     Social History     Tobacco Use   • Smoking status: Never   • Smokeless tobacco: Never   Vaping Use   • Vaping Use: Never used   Substance Use Topics   • Alcohol use: Yes     Comment: gustavo - 1 pint daily   • Drug use: Not Currently       Review of Systems   Constitutional: Negative for chills and fever  HENT: Negative for ear pain and sore throat  Eyes: Negative for pain and visual disturbance  Respiratory: Negative for cough and shortness of breath  Cardiovascular: Positive for palpitations  Negative for chest pain  Gastrointestinal: Positive for nausea  Negative for abdominal pain and vomiting  Genitourinary: Negative for dysuria and hematuria  Musculoskeletal: Negative for arthralgias and back pain  Skin: Negative for color change and rash  Neurological: Positive for tremors  Negative for seizures and syncope  All other systems reviewed and are negative  Physical Exam  Physical Exam  Vitals and nursing note reviewed  Constitutional:       General: She is in acute distress  Appearance: Normal appearance  She is well-developed and normal weight  She is not ill-appearing, toxic-appearing or diaphoretic  HENT:      Head: Normocephalic and atraumatic  Right Ear: External ear normal       Left Ear: External ear normal       Nose: Nose normal       Mouth/Throat:      Mouth: Mucous membranes are dry  Eyes:      Extraocular Movements: Extraocular movements intact        Conjunctiva/sclera: Conjunctivae normal    Cardiovascular:      Rate and Rhythm: Regular rhythm  Tachycardia present  Pulses: Normal pulses  Heart sounds: Normal heart sounds  No murmur heard  Pulmonary:      Effort: Pulmonary effort is normal  No respiratory distress  Breath sounds: Normal breath sounds  No stridor  No wheezing, rhonchi or rales  Chest:      Chest wall: No tenderness  Abdominal:      General: Bowel sounds are normal  There is no distension  Palpations: Abdomen is soft  There is no mass  Tenderness: There is no abdominal tenderness  There is no guarding or rebound  Hernia: No hernia is present  Musculoskeletal:         General: No swelling, tenderness, deformity or signs of injury  Normal range of motion  Cervical back: Neck supple  Right lower leg: No edema  Left lower leg: No edema  Skin:     General: Skin is warm and dry  Capillary Refill: Capillary refill takes less than 2 seconds  Coloration: Skin is not jaundiced  Findings: No lesion  Neurological:      General: No focal deficit present  Mental Status: She is alert and oriented to person, place, and time  Mental status is at baseline  Sensory: No sensory deficit  Motor: No weakness        Gait: Gait normal       Comments: Resting tremors   Psychiatric:         Mood and Affect: Mood normal          Vital Signs  ED Triage Vitals [03/14/23 1106]   Temperature Pulse Respirations Blood Pressure SpO2   (!) 97 4 °F (36 3 °C) (!) 115 18 (!) 172/78 98 %      Temp Source Heart Rate Source Patient Position - Orthostatic VS BP Location FiO2 (%)   Temporal Monitor Sitting Right arm --      Pain Score       --           Vitals:    03/14/23 1230 03/14/23 1300 03/14/23 1330 03/14/23 1400   BP: 122/59 130/61 115/61 153/77   Pulse: 91 85 85 103   Patient Position - Orthostatic VS:             Visual Acuity      ED Medications  Medications   ondansetron (ZOFRAN) injection 4 mg (4 mg Intravenous Given 3/14/23 1151)   sodium chloride 0 9 % bolus 1,000 mL (0 mL Intravenous Stopped 3/14/23 1221)   LORazepam (ATIVAN) injection 2 mg (2 mg Intravenous Given 3/14/23 1152)   chlordiazePOXIDE (LIBRIUM) capsule 50 mg (50 mg Oral Given 3/14/23 1152)       Diagnostic Studies  Results Reviewed     Procedure Component Value Units Date/Time    Ethanol [[id number]]  (Normal) Collected: 03/14/23 1150    Lab Status: Final result Specimen: Blood from Arm, Left Updated: 03/14/23 1309     Ethanol Lvl <10 mg/dL     Comprehensive metabolic panel [[id number]]  (Abnormal) Collected: 03/14/23 1150    Lab Status: Final result Specimen: Blood from Arm, Left Updated: 03/14/23 1308     Sodium 138 mmol/L      Potassium 3 8 mmol/L      Chloride 101 mmol/L      CO2 22 mmol/L      ANION GAP 15 mmol/L      BUN 14 mg/dL      Creatinine 0 67 mg/dL      Glucose 135 mg/dL      Calcium 9 3 mg/dL      AST 49 U/L      ALT 40 U/L      Alkaline Phosphatase 102 U/L      Total Protein 7 8 g/dL      Albumin 4 8 g/dL      Total Bilirubin 1 96 mg/dL      eGFR 86 ml/min/1 73sq m     Narrative:      [place] guidelines for Chronic Kidney Disease (CKD):   •  Stage 1 with normal or high GFR (GFR > 90 mL/min/1 73 square meters)  •  Stage 2 Mild CKD (GFR = 60-89 mL/min/1 73 square meters)  •  Stage 3A Moderate CKD (GFR = 45-59 mL/min/1 73 square meters)  •  Stage 3B Moderate CKD (GFR = 30-44 mL/min/1 73 square meters)  •  Stage 4 Severe CKD (GFR = 15-29 mL/min/1 73 square meters)  •  Stage 5 End Stage CKD (GFR <15 mL/min/1 73 square meters)  Note: GFR calculation is accurate only with a steady state creatinine    CK [[id number]]  (Abnormal) Collected: 03/14/23 1150    Lab Status: Final result Specimen: Blood from Arm, Left Updated: 03/14/23 1308     Total  U/L     CBC and differential [[id number]] Collected: 03/14/23 1150    Lab Status: Final result Specimen: Blood from Arm, Left Updated: 03/14/23 1214     WBC 7 02 Thousand/uL      RBC 4 54 Million/uL      Hemoglobin 14 9 g/dL      Hematocrit 44 4 %      MCV 98 fL      MCH 32 8 pg      MCHC 33 6 g/dL      RDW 13 2 %      MPV 10 4 fL      Platelets 341 Thousands/uL      nRBC 0 /100 WBCs      Neutrophils Relative 75 %      Immat GRANS % 0 %      Lymphocytes Relative 14 %      Monocytes Relative 10 %      Eosinophils Relative 0 %      Basophils Relative 1 %      Neutrophils Absolute 5 25 Thousands/µL      Immature Grans Absolute 0 02 Thousand/uL      Lymphocytes Absolute 0 98 Thousands/µL      Monocytes Absolute 0 71 Thousand/µL      Eosinophils Absolute 0 00 Thousand/µL      Basophils Absolute 0 06 Thousands/µL                  No orders to display              Procedures  Procedures         ED Course  ED Course as of 03/14/23 1458   Tue Mar 14, 2023   1406 Patient was accepted by Dr Gerson Ray   [phone] with PACS nurse and ride is getting set up                                 SBIRT Nadya's Most Recent Value   SBIRT (25 yo +)    In order to provide better care to our patients, we are screening all of our patients for alcohol and drug use  Would it be okay to ask you these screening questions? Yes Filed at: 03/14/2023 1203   Initial Alcohol Screen: US AUDIT-C     1  How often do you have a drink containing alcohol? 6  [when on a binge] Filed at: 03/14/2023 1203   2  How many drinks containing alcohol do you have on a typical day you are drinking? 6 Filed at: 03/14/2023 1203   3a  Male UNDER 65: How often do you have five or more drinks on one occasion? 0 Filed at: 03/14/2023 1203   3b  FEMALE Any Age, or MALE 65+: How often do you have 4 or more drinks on one occassion? 6 Filed at: 03/14/2023 1203   Audit-C Score 18 Filed at: 03/14/2023 1203   ASAD: How many times in the past year have you    Used an illegal drug or used a prescription medication for non-medical reasons?  Never Filed at: 03/14/2023 1203                    Medical Decision Making  DDx: Dehydration, alcohol withdrawal, essential tremors, polysubstance abuse    Amount and/or Complexity of Data Reviewed  Labs: ordered  Risk  Prescription drug management  Disposition  Final diagnoses:   Alcohol dependence ([address] )     Time reflects when diagnosis was documented in both MDM as applicable and the Disposition within this note     Time User Action Codes Description Comment    3/14/2023  2:06 PM Lakeisha Crocker Add [F10 20] Alcohol dependence [facility])       ED Disposition     ED Disposition   Transfer to Another Facility-In Network    Condition   --    Date/Time   Tue Mar 14, 2023  2:03 PM    Comment   Deja Greer should be transferred out to [facility]    None         Patient's Medications   Discharge Prescriptions    No medications on file       No discharge procedures on file      PDMP Review     None          ED Provider  Electronically Signed by           Du Pandey DO  03/14/23 8309

## 2023-03-14 NOTE — ASSESSMENT & PLAN NOTE
· H/o chronic daily alcohol consumption, denies h/o withdrawal seizures  · Last drink: 3/13/23 @ 9 pm   · Ethanol lvl: 3/14/23 <3  · Discontinued SEWS protocol with symptom-triggered phenobarbital for medically-assisted alcohol withdrawal on 3/15/23   · Denies further withdrawal symptoms, has been monitored off of Protocol and does exhibit further withdrawal symptoms   · Stable from withdrawal perspective

## 2023-03-15 LAB
ALBUMIN SERPL BCP-MCNC: 3.8 G/DL (ref 3.5–5)
ALP SERPL-CCNC: 77 U/L (ref 34–104)
ALT SERPL W P-5'-P-CCNC: 29 U/L (ref 7–52)
ANION GAP SERPL CALCULATED.3IONS-SCNC: 9 MMOL/L (ref 4–13)
AST SERPL W P-5'-P-CCNC: 34 U/L (ref 13–39)
ATRIAL RATE: 83 BPM
ATRIAL RATE: 99 BPM
BILIRUB DIRECT SERPL-MCNC: 0.53 MG/DL (ref 0–0.2)
BILIRUB SERPL-MCNC: 1.93 MG/DL (ref 0.2–1)
BUN SERPL-MCNC: 9 MG/DL (ref 5–25)
CALCIUM SERPL-MCNC: 7.9 MG/DL (ref 8.4–10.2)
CHLORIDE SERPL-SCNC: 105 MMOL/L (ref 96–108)
CK SERPL-CCNC: 201 U/L (ref 26–192)
CO2 SERPL-SCNC: 26 MMOL/L (ref 21–32)
CREAT SERPL-MCNC: 0.6 MG/DL (ref 0.6–1.3)
ERYTHROCYTE [DISTWIDTH] IN BLOOD BY AUTOMATED COUNT: 13.1 % (ref 11.6–15.1)
GFR SERPL CREATININE-BSD FRML MDRD: 89 ML/MIN/1.73SQ M
GLUCOSE SERPL-MCNC: 82 MG/DL (ref 65–140)
HCT VFR BLD AUTO: 40.5 % (ref 34.8–46.1)
HGB BLD-MCNC: 13 G/DL (ref 11.5–15.4)
MAGNESIUM SERPL-MCNC: 2 MG/DL (ref 1.9–2.7)
MCH RBC QN AUTO: 31.9 PG (ref 26.8–34.3)
MCHC RBC AUTO-ENTMCNC: 32.1 G/DL (ref 31.4–37.4)
MCV RBC AUTO: 99 FL (ref 82–98)
P AXIS: 46 DEGREES
P AXIS: 60 DEGREES
PLATELET # BLD AUTO: 168 THOUSANDS/UL (ref 149–390)
PMV BLD AUTO: 11 FL (ref 8.9–12.7)
POTASSIUM SERPL-SCNC: 3.4 MMOL/L (ref 3.5–5.3)
PR INTERVAL: 146 MS
PR INTERVAL: 150 MS
PROT SERPL-MCNC: 6.1 G/DL (ref 6.4–8.4)
QRS AXIS: 12 DEGREES
QRS AXIS: 5 DEGREES
QRSD INTERVAL: 84 MS
QRSD INTERVAL: 88 MS
QT INTERVAL: 372 MS
QT INTERVAL: 406 MS
QTC INTERVAL: 477 MS
QTC INTERVAL: 477 MS
RBC # BLD AUTO: 4.08 MILLION/UL (ref 3.81–5.12)
SODIUM SERPL-SCNC: 140 MMOL/L (ref 135–147)
T WAVE AXIS: 29 DEGREES
T WAVE AXIS: 31 DEGREES
VENTRICULAR RATE: 83 BPM
VENTRICULAR RATE: 99 BPM
WBC # BLD AUTO: 4.46 THOUSAND/UL (ref 4.31–10.16)

## 2023-03-15 RX ORDER — POTASSIUM CHLORIDE 20 MEQ/1
40 TABLET, EXTENDED RELEASE ORAL ONCE
Status: COMPLETED | OUTPATIENT
Start: 2023-03-15 | End: 2023-03-15

## 2023-03-15 RX ADMIN — FOLIC ACID 1 MG: 5 INJECTION, SOLUTION INTRAMUSCULAR; INTRAVENOUS; SUBCUTANEOUS at 08:52

## 2023-03-15 RX ADMIN — GABAPENTIN 100 MG: 100 CAPSULE ORAL at 08:32

## 2023-03-15 RX ADMIN — POTASSIUM CHLORIDE 40 MEQ: 1500 TABLET, EXTENDED RELEASE ORAL at 11:41

## 2023-03-15 RX ADMIN — SODIUM CHLORIDE 100 ML/HR: 0.9 INJECTION, SOLUTION INTRAVENOUS at 03:04

## 2023-03-15 RX ADMIN — PAROXETINE HYDROCHLORIDE 20 MG: 20 TABLET, FILM COATED ORAL at 08:32

## 2023-03-15 RX ADMIN — THIAMINE HYDROCHLORIDE 500 MG: 100 INJECTION, SOLUTION INTRAMUSCULAR; INTRAVENOUS at 12:06

## 2023-03-15 RX ADMIN — ENOXAPARIN SODIUM 40 MG: 40 INJECTION SUBCUTANEOUS at 08:32

## 2023-03-15 RX ADMIN — GABAPENTIN 100 MG: 100 CAPSULE ORAL at 16:26

## 2023-03-15 RX ADMIN — GABAPENTIN 100 MG: 100 CAPSULE ORAL at 22:03

## 2023-03-15 RX ADMIN — THIAMINE HYDROCHLORIDE 500 MG: 100 INJECTION, SOLUTION INTRAMUSCULAR; INTRAVENOUS at 01:38

## 2023-03-15 RX ADMIN — MULTIPLE VITAMINS W/ MINERALS TAB 1 TABLET: TAB ORAL at 08:32

## 2023-03-15 RX ADMIN — THIAMINE HYDROCHLORIDE 500 MG: 100 INJECTION, SOLUTION INTRAMUSCULAR; INTRAVENOUS at 17:31

## 2023-03-15 RX ADMIN — Medication 1 TABLET: at 08:32

## 2023-03-15 NOTE — PHYSICAL THERAPY NOTE
Physical Therapy Evaluation    Patient's Name: Benjamin Mckeon    Admitting Diagnosis  Alcohol dependence ([facility] ) [F10 20]    Problem List  Patient Active Problem List   Diagnosis    Gastroesophageal reflux disease without esophagitis    Hypertension    Osteoporosis    Transaminitis    Degenerative joint disease (DJD) of lumbar spine    Anxiety and depression    Alcohol withdrawal syndrome with complication ([facility] )    Alcohol use disorder, severe, dependence ([facility] )    Ambulatory dysfunction       Past Medical History  Past Medical History:   Diagnosis Date    Basal cell carcinoma     Chronic pain of both lower extremities     Fatty liver     GERD (gastroesophageal reflux disease)     High cholesterol     Hypertension     Liver cyst     Osteoporosis     Psychiatric disorder     depression & anxiety    Renal cyst     Spondylolisthesis of lumbar region        Past Surgical History  Past Surgical History:   Procedure Laterality Date    APPENDECTOMY      CARPAL TUNNEL RELEASE      CHOLECYSTECTOMY      DILATION AND CURETTAGE OF UTERUS      EGD AND COLONOSCOPY         Recent Imaging  No orders to display       Recent Vital Signs  Vitals:    03/14/23 1900 03/15/23 0520 03/15/23 0708 03/15/23 1116   BP: 162/97 146/85 127/85 165/79   BP Location: Left arm Right arm Right arm Right arm   Pulse: 91 90 104 78   Resp:  22 20 22   Temp: 97 5 °F (36 4 °C) (!) 97 4 °F (36 3 °C) (!) 97 3 °F (36 3 °C) (!) 97 2 °F (36 2 °C)   TempSrc: Temporal Temporal Temporal Temporal   SpO2: 91% 95% 98% 96%   Weight:       Height:                      03/15/23 1300   PT Last Visit   PT Visit Date 03/15/23   Note Type   Note type Evaluation   Pain Assessment   Pain Assessment Tool 0-10   Pain Score No Pain   Restrictions/Precautions   Other Precautions Telemetry; Fall Risk   Home Living   Type of [address] Elevator   Additional Comments Patient lives alone in a senior high rise apartment   Prior Function   Level of [place] Independent with ADLs; Independent with functional mobility; Independent with IADLS   Lives With Alone   Receives Help From Family  (Patient has a daughter who lives close by and is supportive)   Falls in the last 6 months 0   Cognition   Orientation Level Oriented to person;Oriented to place;Oriented to time;Oriented to situation   RLE Assessment   RLE Assessment WFL   LLE Assessment   LLE Assessment WFL   Vision-Basic Assessment   Current Vision Wears glasses all the time   Bed Mobility   Rolling R 6  Modified independent   Rolling L 6  Modified independent   Supine to Sit 6  Modified independent   Sit to Supine 6  Modified independent   Transfers   Sit to Stand 5  Supervision   Stand to Sit 5  Supervision   Toilet transfer 5  Supervision   Ambulation/Elevation   Gait Assistance 5  Supervision   Assistive Device None   Distance 175 feet x 2   Ambulation/Elevation Additional Comments Patient able to ambulate 175 feet x 2 without an AD and close supervision  LOB x 1 on first walk, able to regain her balance with no LOB on second walk  Balance   Static Sitting Good   Dynamic Sitting Good   Static Standing Good   Dynamic Standing Fair +   Activity Tolerance   Activity Tolerance Patient tolerated treatment well   Assessment   Prognosis Good   Problem List Impaired balance;Decreased mobility   Plan   Treatment/Interventions Functional transfer training;LE strengthening/ROM; Therapeutic exercise;Gait training;Spoke to nursing   PT Frequency 2-3x/wk   Recommendation   PT Discharge Recommendation No rehabilitation needs   Additional Comments Patient will benefit from PT services during her hospital stay, no anticipated further therapy needs at this point     AM-PAC Basic Mobility Inpatient   Turning in Flat Bed Without Bedrails 4   Lying on Back to Sitting on Edge of Flat Bed Without Bedrails 4   Moving Bed to Chair 4   Standing Up From Chair Using Arms 4   Walk in Room 3   Climb 3-5 Stairs With Railing 1   Basic Mobility Inpatient Raw Score 20   Basic Mobility Standardized Score 43 99   Highest Level Of Mobility   -[facility] Goal 6: Walk 10 steps or more   -HL Achieved 7: Walk 25 feet or more   End of Consult   Patient Position at End of Consult Supine; All needs within reach     ASSESSMENT                                                                                                                     Claudeen Gilles is a 78 y o  female admitted to [facility] on 3/14/2023 for Alcohol withdrawal syndrome with complication ([address] )  Pt  has a past medical history of Basal cell carcinoma, Chronic pain of both lower extremities, Fatty liver, GERD (gastroesophageal reflux disease), High cholesterol, Hypertension, Liver cyst, Osteoporosis, Psychiatric disorder, Renal cyst, and Spondylolisthesis of lumbar region    PT was consulted and pt was seen on 3/15/2023 for mobility assessment and d/c planning  Pt presents with a slight decline from her baseline level of function  Patients current level for bed mobility at a MI level, supervision level for transfers, and ambulation up to 175 feet without an AD and close supervision  Patient lives alone in a [facility] apartment with no steps, she does have a daughter who lives close by and is able to assist  Patient will benefit from physical therapy services to achieve her maximal LOF during her stay here, there are no anticipated therapy needs at this time upon discharge  Goals                                                                                                                                    1) Functional transfers with modified independence to facilitate safe return to previous living environment    2)Ambulation distances of 250 feet without an AD and without LOB and stable vitals for safe ambulation home/ community distances  3) Improve balance grades to Good-  to reduce risk of falls    7) PT for ongoing pt and family education; DME needs and D/C planning to promote highest level of function in least restrictive environment  Recommendations                                                                                                              Pt will benefit from continued skilled IP PT to address the above mentioned impairments in order to maximize recovery and increase functional independence when completing mobility and ADLs  See flow sheet for goals and POC       DME: None     Discharge Disposition:  Home to her [facility] apartment with family support

## 2023-03-15 NOTE — UTILIZATION REVIEW
Initial Clinical Review    Pt initially presented to [address] [place] [address] ED  Pt was transferred by EMS to [address] [place] [address] for its Level IV medically managed intensive inpatient detox unit, not available at [place] [address]  Admission: Date/Time/Statement:   Admission Orders (From admission, onward)     Ordered        03/14/23 1703  Inpatient Admission  Once                      Orders Placed This Encounter   Procedures   • Inpatient Admission     Standing Status:   Standing     Number of Occurrences:   1     Order Specific Question:   Level of Care     Answer:   Med Surg [16]     Order Specific Question:   Estimated length of stay     Answer:   More than 2 Midnights     Order Specific Question:   Certification     Answer:   I certify that inpatient services are medically necessary for this patient for a duration of greater than two midnights  See H&P and MD Progress Notes for additional information about the patient's course of treatment  Initial Presentation: 78 y o  female who presented to [address] [place] [address], was then transferred by EMS to [address] [place] [address] as a direct admission to medical detox  Inpatient admission for evaluation and treatment of alcohol withdrawal syndrome  PMHx: chronic pain, GERD, HTN, psychiatric disorder  Presented w/ request for detox from alcohol  Reports 1 pint gustavo daily, last drink on 3/13 evening  Has no prior inpatient or outpatient treatment for withdrawal  Reports no hx of withdrawal seizures  On exam, anxiety, tremors, tachycardic, hypertensive, abnormal gait  Endorses nausea  SEWS 13  Plan: SEWS monitoring w/ phenobarbital management, high dose IV thiamine/folic acid supplement, IVF, telemetry, continuous pulse ox, continue home meds except anti-HTN, trend labs, replete electrolytes as needed  Date: 03/15/23       Day 2: Pt reports doing well, less shaky  Considering naltrexone  On exam, unremarkable  SEWS 0  Plan: continue SEWS monitoring w/ phenobarbital management, high dose IV thiamine/folic acid supplement, telemetry, continuous pulse ox, continue home meds, trend labs, replete electrolytes as needed  Wt Readings from Last 1 Encounters:   03/14/23 67 6 kg (149 lb)     Vital Signs:   Date/Time Temp Pulse Resp BP MAP (mmHg) SpO2 O2 Device   03/15/23 1116 97 2 °F (36 2 °C) Abnormal  78 22 165/79 107 96 % None (Room air)   03/15/23 0708 97 3 °F (36 3 °C) Abnormal  104 20 127/85 99 98 % None (Room air)   03/15/23 0520 97 4 °F (36 3 °C) Abnormal  90 22 146/85 105 95 % None (Room air)   03/14/23 1900 97 5 °F (36 4 °C) 91 -- 162/97 -- 91 % None (Room air)   03/14/23 1716 98 9 °F (37 2 °C) 110 Abnormal  22 157/92 -- 97 % None (Room air)       Severity of Ethanol Withdrawal Scale (SEWS):   Row Name 03/15/23 1146 03/15/23 0830 03/15/23 0530 03/15/23 0000 03/14/23 2000   Severity of Ethanol Withdrawal Scale (SEWS)   ANXIETY: Do you feel that something bad is about to happen to you right now? 0  -LB 0  -LB 0  -SB 0  -SB 0  -SB   NAUSEA and DRY HEAVES or VOMITING? 0  -LB 0  -LB 0  -SB 0  -SB 0  -SB   SWEATING: (includes moist palms, sweating now)? Score 0 or 2 0  -LB 0  -LB 0  -SB 0  -SB 0  -SB   TREMOR: with arms extended eyes closed? 0  -LB 0  -LB 0  -SB 0  -SB 0  -SB   AGITATION: Fidgety, restless, pacing? 0  -LB 0  -LB 0  -SB 0  -SB 0  -SB   DISORIENTATION: 0  -LB 0  -LB 0  -SB 0  -SB 0  -SB   HALLUCINATIONS: 0  -LB 0  -LB 0  -SB 0  -SB 0  -SB   VITAL SIGNS: ANY (Pulse >202, Diastolic BP >18, Temp >92 8) 0  -LB 0  -LB 0  -SB 0  -SB 0  -SB   SEWS Total Score 0  -LB 0  -LB 0  -SB 0  -SB 0  -SB   Paula Agitation Sedation Scale (RASS)   Paula Agitation Sedation Scale (RASS) -- 0  -LB 0  -SB -2  -SB -2  -SB   Row Name 03/14/23 1900 03/14/23 9974      Severity of Ethanol Withdrawal Scale (SEWS)   ANXIETY: Do you feel that something bad is about to happen to you right now?   0  -SB 3  -KH      NAUSEA and DRY HEAVES or VOMITING? 0  -SB 3  -KH      SWEATING: (includes moist palms, sweating now)? Score 0 or 2 0  -SB 2  -KH      TREMOR: with arms extended eyes closed? 0  -SB 2  -KH      AGITATION: Fidgety, restless, pacing?  0  -SB 0  -KH      DISORIENTATION: 1  hold per provider  -SB 0  -KH      HALLUCINATIONS: 0  -SB 0  -KH      VITAL SIGNS: ANY (Pulse >958, Diastolic BP >90, Temp >31 1) 0  -SB 3  -KH      SEWS Total Score 1  -SB 13  -KH            Pertinent Labs/Diagnostic Test Results:   Results from last 7 days   Lab Units 03/15/23  0526 03/14/23  1150   WBC Thousand/uL 4 46 7 02   HEMOGLOBIN g/dL 13 0 14 9   HEMATOCRIT % 40 5 44 4   PLATELETS Thousands/uL 168 221   NEUTROS ABS Thousands/µL  --  5 25         Results from last 7 days   Lab Units 03/15/23  0526 03/14/23  1150   SODIUM mmol/L 140 138   POTASSIUM mmol/L 3 4* 3 8   CHLORIDE mmol/L 105 101   CO2 mmol/L 26 22   ANION GAP mmol/L 9 15*   BUN mg/dL 9 14   CREATININE mg/dL 0 60 0 67   EGFR ml/min/1 73sq m 89 86   CALCIUM mg/dL 7 9* 9 3   MAGNESIUM mg/dL 2 0  --      Results from last 7 days   Lab Units 03/15/23  0526 03/14/23  1150   AST U/L 34 49*   ALT U/L 29 40   ALK PHOS U/L 77 102   TOTAL PROTEIN g/dL 6 1* 7 8   ALBUMIN g/dL 3 8 4 8   TOTAL BILIRUBIN mg/dL 1 93* 1 96*   BILIRUBIN DIRECT mg/dL 0 53*  --          Results from last 7 days   Lab Units 03/15/23  0526 03/14/23  1150   GLUCOSE RANDOM mg/dL 82 135       Results from last 7 days   Lab Units 03/15/23  0526 03/14/23  1150   CK TOTAL U/L 201* 392*         Results from last 7 days   Lab Units 03/14/23  1150   ETHANOL LVL mg/dL <10         Past Medical History:   Diagnosis Date   • Basal cell carcinoma    • Chronic pain of both lower extremities    • Fatty liver    • GERD (gastroesophageal reflux disease)    • High cholesterol    • Hypertension    • Liver cyst    • Osteoporosis    • Psychiatric disorder     depression & anxiety   • Renal cyst    • Spondylolisthesis of lumbar region      Present on Admission:  • Gastroesophageal reflux disease without esophagitis  • Hypertension  • Osteoporosis  • Transaminitis  • Degenerative joint disease (DJD) of lumbar spine  • Anxiety and depression  • Alcohol withdrawal syndrome with complication (HCC)  • Alcohol use disorder, severe, dependence ([facility] )  • Ambulatory dysfunction      Admitting Diagnosis: Alcohol dependence ([facility] ) [F10 20]  Age/Sex: 78 y o  female  Admission Orders:  Regular Diet  SCDs  Fall & Seizure Precautions  SEWS monitoring  Telemetry & Continuous Pulse Ox  Scheduled Medications:  calcium carbonate-vitamin D, 1 tablet, Oral, Daily With Breakfast  enoxaparin, 40 mg, Subcutaneous, Daily  folic acid IVPB, 1 mg, Intravenous, Daily  gabapentin, 100 mg, Oral, TID  multivitamin-minerals, 1 tablet, Oral, Daily  PARoxetine, 20 mg, Oral, Daily  thiamine, 500 mg, Intravenous, Q8H    Continuous IV Infusions:  sodium chloride, 100 mL/hr, Intravenous, Continuous    PRN Meds:  acetaminophen, 650 mg, Oral, Q6H PRN  ondansetron, 4 mg, Intravenous, Q6H PRN  phenobarbital, 650 mg, Intravenous; 3/14 x1  potassium chloride, 40 mEq, Oral; 3/15 x1  traZODone, 50 mg, Oral, HS PRN        IP CONSULT TO CASE MANAGEMENT    Network Utilization Review Department  ATTENTION: Please call with any questions or concerns to [phone] and carefully listen to the prompts so that you are directed to the right person  All voicemails are confidential   [facility] all requests for admission clinical reviews, approved or denied determinations and any other requests to dedicated fax number below belonging to the campus where the patient is receiving treatment   List of dedicated fax numbers for the Facilities:  [address] [place] [address] DENIALS (Administrative/Medical Necessity) [phone]   [address] (Maternity/NICU/Pediatrics) Sonya Cabrera [facility] [phone]   [facility] [phone]   [facility] [address] [place] [place] [id number] [place] [address] 28 U [facility] 310 [facility] 134 5 [facility] [phone]

## 2023-03-15 NOTE — PROGRESS NOTES
Progress Note - Medical Toxicology    Bao Banegas 78 y o  female MRN: [id number]  Unit/Bed#: 5T DETOX 508-01 Encounter: [id number]  MEDICAL DETOX UNIT, LEVEL 4  Department of Medical Toxicology  Reason for Admission/Principal Problem: alcohol withdrawal  Rounding Provider: Casandra Novak DO, Rachael Christyne Oval Drier*         Ambulatory dysfunction  Assessment & Plan  · Patient shaky on feet at time of admission   · Able to walk a few steps from the stretcher to the hospital bed   · Denies assisted walking devices at home or recent falls     · High dose thiamine regimen initiated   · PT consulted     Alcohol use disorder, severe, dependence ([facility] )  Assessment & Plan  · Patient reports chronic alcohol use for the last 5 years   · Notes periods of binging, most recently drinking 1 pint of gustavo daily for the last few weeks  · Last drink in the evening of 3/13  · Denies any previous withdrawal or treatment for alcohol use  · Patient initially presented to outpatient appointment for alcohol use when they recommended she present to ED for detox  · Interested in outpatient on discharge    · Case management for assistance in discharge planning, requested outpatient rehab  · Thinking about naltrexone   · See Alcohol Withdrawal    Anxiety and depression  Assessment & Plan  · Patient endorses a h/o chronic anxiety and depression  · Home medications include paxil 20 mg   · Reports compliance with home medications   · Denies SI/HI/thoughts of self harm  · Re-start home medications  · Recommend OP f/u with PCP/OP Psychiatry      Degenerative joint disease (DJD) of lumbar spine  Assessment & Plan  · Hx of lumbar DJD  · Continue at home treatment of gabapentin 100mg TID and daily calcium supplementation   · Lidocaine patch prn   · Tylenol for pain     Transaminitis  Assessment & Plan  Recent Labs     03/14/23  1150   AST 49*   ALT 40   TBILI 1 96*     · Patient with mildly elevated transaminases POA  · In the setting of chronic alcohol use    · Encourage cessation  · IV hydration  · Continue to monitor    Osteoporosis  Assessment & Plan  · Patient receive calcium supplementation daily along with monthly romosozumab-appg (Evenity) SQ injections for osteoporosis  · Last injection 3/8/23    · Continue home calcium supplementation    Hypertension  Assessment & Plan  · Hx of chronic hypertension   · At home regimen of Lisinopril 30 mg daily and Amlodopine 5 mg   · Current BP:  · Plan to hold anti-hypertensive medication while on protocol; resume when off of protocol  · Continue to monitor BP    Gastroesophageal reflux disease without esophagitis  Assessment & Plan  · Continue PPI     * Alcohol withdrawal syndrome with complication ([facility] )  Assessment & Plan  · H/o chronic daily alcohol consumption, denies h/o withdrawal seizures  · Last drink: 3/13/23 @ 9 pm   · Ethanol lvl: 3/14/23 <3  · Follow SEWS protocol with symptom-triggered phenobarbital for medically-assisted alcohol withdrawal  · Current symptoms include anxiety, impulsiveness, hypertensive, tachycardic, and tremulous   · SEWS score upon admission- 13  · Administer initial dose of 650 mg phenobarbital 3/14  · Continue telemetry and pulse oximetry monitoring   · Continue to monitor vitals, symptoms  · Awaiting EKG                 VTE Pharmacologic Prophylaxis:   Pharmacologic: Rivaroxaban (Xarelto)  Mechanical VTE Prophylaxis in Place: no    Code Status: Level 1 - Full Code    Patient Centered Rounds: I have performed bedside rounds with nursing staff today  Discussions with Specialists or Other Care Team Provider: none   Education and Discussions with Family / Patient: discussed options with patient, called daughter    Time Spent for Care: 30 minutes  More than 50% of total time spent on counseling and coordination of care as described above      Current Length of Stay: 1 day(s)    Current Patient Status: Inpatient     Certification Statement: The patient will continue to require additional inpatient hospital stay due to ataxia, continued tx  Discharge Plan: home      Total time spent today 30 minutes  Greater than 50% of total time was spent with the patient and / or family counseling and / or coordination of care  A description of the counseling / coordination of care: as above    Subjective:   Patient states she is doing well, less shaky but still intermittently  She states she started drinking regularly when she was 48, but has been drinking a 5th daily for 4 years  Denies nausea vomiting, anxiety or sweating  States she is a picky eater and does not eat much  Informed about high dose thiamine, voiced understanding  Discussed naltrexone, will think about it  Denies other acute issues  Gave permission to discuss with Anthony Chin (daughter, number in chart), no answer       Objective:     Clinical Opiate Withdrawal Scale  Pulse: 104    SEWS Total Score: 0 (3/15/2023  8:30 AM)        Last 24 Hours Medication List:   Current Facility-Administered Medications   Medication Dose Route Frequency Provider Last Rate   • acetaminophen  650 mg Oral Q6H PRN Nay Marcelino PA-C     • calcium carbonate-vitamin D  1 tablet Oral Daily With Breakfast Nay Marcelino PA-C     • diazepam  10 mg Intravenous Once Abbi Acosta MD     • enoxaparin  40 mg Subcutaneous Daily Nay Marcelino PA-C     • folic acid IVPB  1 mg Intravenous Daily Nay Marcelino PA-C 1 mg (03/15/23 7289)   • gabapentin  100 mg Oral TID Nay Marcelino PA-C     • multivitamin-minerals  1 tablet Oral Daily Nay Marcelino PA-C     • ondansetron  4 mg Intravenous Q6H PRN Nay Marcelino PA-C     • PARoxetine  20 mg Oral Daily Nay Marcelino PA-C     • potassium chloride  40 mEq Oral Once Redge Crape, DO     • sodium chloride  100 mL/hr Intravenous Continuous Nay Marcelino PA-C 100 mL/hr (03/15/23 030)   • thiamine  500 mg Intravenous Q8H aNy Marcelino  mg (03/15/23 0138)   • traZODone  50 mg Oral HS PRN Scout Lemus PA-C           Vitals:   Temp (24hrs), Av 7 °F (36 5 °C), Min:97 3 °F (36 3 °C), Max:98 9 °F (37 2 °C)    Temp:  [97 3 °F (36 3 °C)-98 9 °F (37 2 °C)] 97 3 °F (36 3 °C)  HR:  [] 104  Resp:  [16-22] 20  BP: (115-172)/(59-97) 127/85  SpO2:  [91 %-98 %] 98 %  Body mass index is 24 79 kg/m²  Input and Output Summary (last 24 hours):No intake or output data in the 24 hours ending 03/15/23 1046    Physical Exam:   Physical Exam  Vitals and nursing note reviewed  Constitutional:       General: She is not in acute distress  Appearance: Normal appearance  She is normal weight  She is not toxic-appearing  HENT:      Head: Normocephalic and atraumatic  Right Ear: External ear normal       Left Ear: External ear normal    Eyes:      General:         Right eye: No discharge  Left eye: No discharge  Extraocular Movements: Extraocular movements intact  Conjunctiva/sclera: Conjunctivae normal    Cardiovascular:      Rate and Rhythm: Normal rate and regular rhythm  Pulses: Normal pulses  Heart sounds: Normal heart sounds  Pulmonary:      Effort: Pulmonary effort is normal  No respiratory distress  Breath sounds: Normal breath sounds  Abdominal:      General: Abdomen is flat  Bowel sounds are normal  There is no distension  Palpations: Abdomen is soft  Tenderness: There is no abdominal tenderness  Musculoskeletal:         General: No swelling or tenderness  Skin:     General: Skin is warm and dry  Coloration: Skin is not jaundiced or pale  Neurological:      General: No focal deficit present  Mental Status: She is alert and oriented to person, place, and time  Psychiatric:         Mood and Affect: Mood normal          Behavior: Behavior normal          Thought Content:  Thought content normal          Judgment: Judgment normal          Additional Data:     Labs: keep all most recent labs as listed on admission templates   Results from last 7 days   Lab Units 03/15/23  0526 03/14/23  1150   WBC Thousand/uL 4 46 7 02   HEMOGLOBIN g/dL 13 0 14 9   HEMATOCRIT % 40 5 44 4   PLATELETS Thousands/uL 168 221   NEUTROS PCT %  --  75   LYMPHS PCT %  --  14   MONOS PCT %  --  10   EOS PCT %  --  0      Results from last 7 days   Lab Units 03/15/23  0526   SODIUM mmol/L 140   POTASSIUM mmol/L 3 4*   CHLORIDE mmol/L 105   CO2 mmol/L 26   BUN mg/dL 9   CREATININE mg/dL 0 60   ANION GAP mmol/L 9   CALCIUM mg/dL 7 9*   ALBUMIN g/dL 3 8   TOTAL BILIRUBIN mg/dL 1 93*   ALK PHOS U/L 77   ALT U/L 29   AST U/L 34   GLUCOSE RANDOM mg/dL 82                              * I Have Reviewed All Lab Data Listed Above  * Additional Pertinent Lab Tests Reviewed: All Labs Within Last 24 Hours Reviewed      Imaging Studies: I have personally reviewed pertinent reports  awaiting EKG      Recent Cultures (last 7 days): Today, Patient Was Seen By: Denise Sharpe DO    ** Please Note: Dictation voice to text software may have been used in the creation of this document   **

## 2023-03-15 NOTE — PLAN OF CARE
Problem: PHYSICAL THERAPY ADULT  Goal: Performs mobility at highest level of function for planned discharge setting  See evaluation for individualized goals  Description: Treatment/Interventions: Functional transfer training, LE strengthening/ROM, Therapeutic exercise, Gait training, Spoke to nursing          See flowsheet documentation for full assessment, interventions and recommendations  Outcome: Progressing  Note: Prognosis: Good  Problem List: Impaired balance, Decreased mobility           PT Discharge Recommendation: No rehabilitation needs    See flowsheet documentation for full assessment

## 2023-03-15 NOTE — PLAN OF CARE
Problem: SUBSTANCE USE/ABUSE  Goal: By discharge, will develop insight into their chemical dependency and sustain motivation to continue in recovery  Description: INTERVENTIONS:  - Attends all daily group sessions and scheduled AA groups  - Actively practices coping skills through participation in the therapeutic community and adherence to program rules  - Reviews and completes assignments from individual treatment plan  - Assist patient development of understanding of their personal cycle of addiction and relapse triggers  Outcome: Progressing  Goal: By discharge, patient will have ongoing treatment plan addressing chemical dependency  Description: INTERVENTIONS:  - Assist patient with resources and/or appointments for ongoing recovery based living  Outcome: Progressing     Problem: COPING  Goal: Pt/Family able to verbalize concerns and demonstrate effective coping strategies  Description: INTERVENTIONS:  - Assist patient/family to identify coping skills, available support systems and cultural and spiritual values  - Provide emotional support, including active listening and acknowledgement of concerns of patient and caregivers  - Reduce environmental stimuli, as able  - Provide patient education  - Assess for spiritual pain/suffering and initiate spiritual care, including notification of Pastoral Care or komal based community as needed  - Assess effectiveness of coping strategies  Outcome: Progressing  Goal: Will report anxiety at manageable levels  Description: INTERVENTIONS:  - Administer medication as ordered  - Teach and encourage coping skills  - Provide emotional support  - Assess patient/family for anxiety and ability to cope  Outcome: Progressing     Problem: MOBILITY - ADULT  Goal: Maintain or return to baseline ADL function  Description: INTERVENTIONS:  -  Assess patient's ability to carry out ADLs; assess patient's baseline for ADL function and identify physical deficits which impact ability to perform ADLs (bathing, care of mouth/teeth, toileting, grooming, dressing, etc )  - Assess/evaluate cause of self-care deficits   - Assess range of motion  - Assess patient's mobility; develop plan if impaired  - Assess patient's need for assistive devices and provide as appropriate  - Encourage maximum independence but intervene and supervise when necessary  - Involve family in performance of ADLs  - Assess for home care needs following discharge   - Consider OT consult to assist with ADL evaluation and planning for discharge  - Provide patient education as appropriate  Outcome: Progressing  Goal: Maintains/Returns to pre admission functional level  Description: INTERVENTIONS:  - Perform BMAT or MOVE assessment daily    - Set and communicate daily mobility goal to care team and patient/family/caregiver     - Collaborate with rehabilitation services on mobility goals if consulted  - Out of bed for toileting  - Record patient progress and toleration of activity level   Outcome: Progressing

## 2023-03-15 NOTE — DISCHARGE INSTR - OTHER ORDERS
DRUG AND ALCOHOL RESOURCES  [facility]    If you have health insurance, including medical assistance, there should be a phone number on your insurance card that you can call to find out how to access services  The card may say, “For [address] or “For Drug and Alcohol Services” or “For Substance Abuse Services” call the number provided  Or call PA Get Help Now at [address] [facility] Drug and Alcohol  Drug & Alcohol Program  [address]  [phone]  Contact: Precious Mccormick  Confidential free help, from public health agencies, to find substance use treatment and information  [phone]    12 step Meetings     [place] [address] [address] AA meetings  Serving Amrit Desai [address] in  [facility]    Call [address] At:  [phone]    About co nz    [address] [facility]: [address] [facility] Crisis [facility]vention  [phone] or [address]: [address] , [address] on [address] [facility] ([facility]) HELPLINE: [phone]/Website: www [facility] org  *Substance Abuse and [address] [facility], which is a confidential, free, 24-hour-a-day, 365-day-a-year, information service for individuals and family members facing mental health and/or substance use disorders  This service provides referrals to local treatment facilities, support groups, and community-based organizations  Callers can also order free publications and other information    Call 7-391.268.3398/Website: www [facility] gov  *[facility] 2-1-1: This is a toll free, confidential, 24-hour-a-day service which connects you to a community  in your area who can help you find services and resources that are available to you locally and provide critical services that can improve and save lives    Call: 211  /Website: [email or web address]

## 2023-03-15 NOTE — PROGRESS NOTES
03/15/23 7075   Referral Data   Referral Source Family   Referral Name [place] [address]   Referral Reason Drug/Alcohol [facility] [address]   Readmission Root Cause   30 Day Readmission No   Patient Information   Mental Status Alert   Primary Caregiver Self   Support System Immediate family;Friends   Hoahaoism/Cultural Requests n/a   Legal Information   Legal Issues pt denies   Activities of Daily Living Prior to Admission   Functional Status Independent   Assistive Device No device needed   Living Arrangement Apartment;Lives alone   Ambulation Independent   Access to Firearms   Access to Firearms No  (pt denies)   [address] SSI/SSD  ($1700/monthly)   Means of Transportation   Means of Transport to Appts: Drives Self     Pt is a 78year old female who was admitted to detox for alcohol withdrawal  Pt had presented at the [place] [address] ED with family requesting detox  Pt's name, date of birth, home address, and telephone number were verified  Pt was informed of case management role and the purpose of the completion of intake with case management  Pt presented as cooperative  Pt states she drinks about 1 pint of gustavo about 3x weekly  Family reports pt binges drinks and her current binge has been for a few weeks during which pt had been drinking almost 1 pint daily  Pt reports last alcohol use 3/13/2023 of  5 pt gustavo and first use at age 30  Pt reports her alcohol use increased around the age of 50 but could not provide any reason for this increase  Pt reports a period of abstinence of 1 month after attempting to stop  Pt denied any previous JANET treatment or 12 step meeting attendance  Pt reports no current withdrawal symptoms but reports a hx of tremors, vomiting, and nausea  Pt denied a hx of withdrawal seizures, hallucinations, or Dt's  Pt reports occasional black out alcohol use  Pt denied any family hx of substance abuse      AUDIT: no score  PAWSS:3  Ethanol in ED: <10  UDS: not completed    Pt reports a diagnosis of depression and anxiety  Pt states she receives medication from her PCP that she has been taking  Pt denied any previous inpatient mental health treatment  Pt denied any SI or HI, denied any AH/VH, denied any x of abuse or trauma, denied any recent losses, denied any access to firearms, denied any family hx of mental health needs  Pt has current medical conditions of hypertension, osteoporosis, and GERD  Pt signed an ABHILASH for [facility], PCP  This office was contacted at  [phone] and informed of pt's admission  Pt has current medical insurance of [facility], ABHILASH signed, and did not identify a preferred pharmacy at this time  Pt signed an IMM notification  Pt denied any legal issues  Pt reports she receives SSI in the amount of $1700/mothly  Pt reports she completed high school  Pt states she has a car and a license  Pt denied any  service and denied any religous or cultural request     Pt reports she resides in an elderly high rise in an apartment, alone  Pt signed an ABHILASH for Halima Guallpa, daughter, and she was contacted at [phone]  [facility] was provided with an update and information regarding discharge planning  [facility] stated she could provide pt with transportation home and ensure pt attends her outpatient appointments  Pt and Cm completed relapse prevention plan  Pt and Cm signed plan and pt received a copy of this plan  Pt displayed very little insight into her alcohol use and struggled to provide any triggers or coping skills  Pt stated she was agreeable to starting outpatient JANET treatment after cm discussed the importance of support to address her alcohol use relapse potential  Pt signed an ABHILASH for [facility] group and this office was contacted  Pt was scheduled an intake appointment and [facility] funding assessment for 3/22/2023    Pt's clinical presentation indicates pt has very or no insight into her relapse potential and triggers  Pt's barriers appear to be pt's ability to find age appropriate treatment and pt's lack of sufficient health insurance  Pt's goals for detox are to successfully complete medical withdrawal and to develop a discharge plan that includes alcohol use disorder education  Pt presents in the contemplation stage of change

## 2023-03-16 VITALS
BODY MASS INDEX: 24.83 KG/M2 | OXYGEN SATURATION: 100 % | HEIGHT: 65 IN | WEIGHT: 149 LBS | RESPIRATION RATE: 18 BRPM | HEART RATE: 102 BPM | DIASTOLIC BLOOD PRESSURE: 88 MMHG | SYSTOLIC BLOOD PRESSURE: 154 MMHG | TEMPERATURE: 99 F

## 2023-03-16 PROBLEM — M62.82 RHABDOMYOLYSIS: Status: RESOLVED | Noted: 2023-03-16 | Resolved: 2023-03-16

## 2023-03-16 PROBLEM — R26.2 AMBULATORY DYSFUNCTION: Status: RESOLVED | Noted: 2023-03-14 | Resolved: 2023-03-16

## 2023-03-16 PROBLEM — M62.82 RHABDOMYOLYSIS: Status: ACTIVE | Noted: 2023-03-16

## 2023-03-16 PROBLEM — F10.939 ALCOHOL WITHDRAWAL SYNDROME WITH COMPLICATION (HCC): Status: RESOLVED | Noted: 2023-03-14 | Resolved: 2023-03-16

## 2023-03-16 PROBLEM — R74.01 TRANSAMINITIS: Status: RESOLVED | Noted: 2023-03-14 | Resolved: 2023-03-16

## 2023-03-16 LAB
ANION GAP SERPL CALCULATED.3IONS-SCNC: 9 MMOL/L (ref 4–13)
BUN SERPL-MCNC: 5 MG/DL (ref 5–25)
CALCIUM SERPL-MCNC: 8.9 MG/DL (ref 8.4–10.2)
CHLORIDE SERPL-SCNC: 102 MMOL/L (ref 96–108)
CK SERPL-CCNC: 168 U/L (ref 26–192)
CO2 SERPL-SCNC: 28 MMOL/L (ref 21–32)
CREAT SERPL-MCNC: 0.7 MG/DL (ref 0.6–1.3)
GFR SERPL CREATININE-BSD FRML MDRD: 85 ML/MIN/1.73SQ M
GLUCOSE SERPL-MCNC: 85 MG/DL (ref 65–140)
MAGNESIUM SERPL-MCNC: 1.8 MG/DL (ref 1.9–2.7)
POTASSIUM SERPL-SCNC: 3.5 MMOL/L (ref 3.5–5.3)
SODIUM SERPL-SCNC: 139 MMOL/L (ref 135–147)

## 2023-03-16 RX ORDER — MAGNESIUM SULFATE HEPTAHYDRATE 40 MG/ML
2 INJECTION, SOLUTION INTRAVENOUS ONCE
Status: COMPLETED | OUTPATIENT
Start: 2023-03-16 | End: 2023-03-16

## 2023-03-16 RX ADMIN — PAROXETINE HYDROCHLORIDE 20 MG: 20 TABLET, FILM COATED ORAL at 08:59

## 2023-03-16 RX ADMIN — MULTIPLE VITAMINS W/ MINERALS TAB 1 TABLET: TAB ORAL at 08:59

## 2023-03-16 RX ADMIN — THIAMINE HYDROCHLORIDE 500 MG: 100 INJECTION, SOLUTION INTRAMUSCULAR; INTRAVENOUS at 01:56

## 2023-03-16 RX ADMIN — Medication 1 TABLET: at 08:59

## 2023-03-16 RX ADMIN — GABAPENTIN 100 MG: 100 CAPSULE ORAL at 16:21

## 2023-03-16 RX ADMIN — FOLIC ACID 1 MG: 5 INJECTION, SOLUTION INTRAMUSCULAR; INTRAVENOUS; SUBCUTANEOUS at 09:11

## 2023-03-16 RX ADMIN — GABAPENTIN 100 MG: 100 CAPSULE ORAL at 08:59

## 2023-03-16 RX ADMIN — THIAMINE HYDROCHLORIDE 500 MG: 100 INJECTION, SOLUTION INTRAMUSCULAR; INTRAVENOUS at 10:02

## 2023-03-16 RX ADMIN — ENOXAPARIN SODIUM 40 MG: 40 INJECTION SUBCUTANEOUS at 08:58

## 2023-03-16 RX ADMIN — MAGNESIUM SULFATE HEPTAHYDRATE 2 G: 40 INJECTION, SOLUTION INTRAVENOUS at 12:13

## 2023-03-16 NOTE — CASE MANAGEMENT
Case Management Discharge Planning Note    Patient name Harini Membreno  Location 5T DETOX 508/5T DETOX 50* MRN [id number]  : 1945 Date 3/16/2023       Current Admission Date: 3/14/2023  Current Admission Diagnosis:Alcohol withdrawal syndrome with complication [facility])   Patient Active Problem List    Diagnosis Date Noted   • Gastroesophageal reflux disease without esophagitis 2023   • Hypertension 2023   • Osteoporosis 2023   • Transaminitis 2023   • Degenerative joint disease (DJD) of lumbar spine 2023   • Anxiety and depression 2023   • Alcohol withdrawal syndrome with complication ([facility] )    • Alcohol use disorder, severe, dependence ([facility] ) 2023   • Ambulatory dysfunction 2023      LOS (days): 2  Geometric Mean LOS (GMLOS) (days):   Days to GMLOS:     OBJECTIVE:  Risk of Unplanned Readmission Score: 5 6         Current admission status: Inpatient   Preferred Pharmacy: No Pharmacies Listed  Primary Care Provider: Moon Perera MD    Primary Insurance: [facility]  Secondary Insurance:     DISCHARGE DETAILS: Cm consulted with medical provider and informed pt ready for discharge  Cm contacted pt PCP and scheduled a transition of care appointment for pt  Cm met with pt and reviewed with pt her aftercare appointments for PCP and OP JANET tx  Pt in agreement with attending these appointments  Pt stated her daughter would be providing her with transportation home  Cm contacted pt's daughter, Gerardo Bowden, by phone and discussed pt discharge plan  Gerardo Bowden stated she would provide pt with transportation home but could not arrive until after 6pm  Cm informed nursing staff of pt transportation time  Pt will be discharged home today with medications sent to home pharmacy of [address] [place] [address]      Discharge planning discussed with[de-identified] patient  Freedom of Choice: Yes                   Contacts  Patient Contacts: [facility]  Relationship to Patient[de-identified] Treatment Provider  Reason/Outcome: Continuity of Care, Discharge Planning              Other Referral/Resources/Interventions Provided:  Referrals Provided[de-identified] Support Group, Therapist, Crisis Hotline    Would you like to participate in our [facility] service program?  : No - Declined                                        IMM Given (Date):: 03/15/23  IMM Given to[de-identified] Patient  Family notified[de-identified] Margaret Romano (daughter)

## 2023-03-16 NOTE — DISCHARGE SUMMARY
MEDICAL DETOX UNIT, LEVEL 4  Department of Medical Toxicology  Reason for Admission/Principal Problem: alcohol withdrawal   Admitting provider: Luis A Dougherty PA-C  No att  providers found   3/14/2023  5:00 PM       Discharging Physician / Practitioner: Luis A Dougherty PA-C  PCP: Ramon Salgado MD  Admission Date:   Admission Orders (From admission, onward)     Ordered        03/14/23 1703  Inpatient Admission  Once                      Discharge Date: 03/16/23    Medical Problems     Resolved Problems  Never Reviewed          Resolved    Transaminitis 3/16/2023     Resolved by  Luis A Dougherty PA-C    * (Principal) Alcohol withdrawal syndrome with complication ([facility] [facility] ) 9/25/0492     Resolved by  Luis A Dougherty PA-C    Ambulatory dysfunction 3/16/2023     Resolved by  Luis A Dougherty PA-C    Rhabdomyolysis 3/16/2023     Resolved by  Luis A Dougherty PA-C          Alcohol use disorder, severe, dependence ([facility] )  Assessment & Plan  · Patient reports chronic alcohol use for the last 5 years   · Notes periods of binging, most recently drinking 1 pint of gustavo daily for the last few weeks  · Last drink in the evening of 3/13  · Denies any previous withdrawal or treatment for alcohol use  · Patient initially presented to outpatient appointment for alcohol use when they recommended she present to ED for detox  · Interested in outpatient on discharge  · See Alcohol Withdrawal    Anxiety and depression  Assessment & Plan  · Patient endorses a h/o chronic anxiety and depression  · Home medications include paxil 20 mg   · Reports compliance with home medications   · Denies SI/HI/thoughts of self harm  · Re-start home medications  · Recommend OP f/u with PCP/OP Psychiatry      Degenerative joint disease (DJD) of lumbar spine  Assessment & Plan  · Hx of lumbar DJD  · Continue at home treatment of gabapentin 100mg TID and daily calcium supplementation   · Lidocaine patch prn   · Tylenol for pain     Osteoporosis  Assessment & Plan  · Patient receive calcium supplementation daily along with monthly romosozumab-appg (Evenity) SQ injections for osteoporosis  · Last injection 3/8/23    · Continue home calcium supplementation    Hypertension  Assessment & Plan  · Hx of chronic hypertension   · At home regimen of Lisinopril 30 mg daily and Amlodopine 5 mg   · Current BP:154/88  · Resume anti-hypertensive medication upon discharge  · Recommend follow up with PCP upon discharge       Gastroesophageal reflux disease without esophagitis  Assessment & Plan  · Continue PPI     Rhabdomyolysis-resolved as of 3/16/2023  Assessment & Plan  · Elevated CK upon admission  · Has resolved with IV fluid administration     Ambulatory dysfunction-resolved as of 3/16/2023  Assessment & Plan  · Patient shaky on feet at time of admission    · Gait has improved upon admission with administration of thiamine supplementation  · PT evaluated patient- recommended home with home support  · At time of discharge patient was observed ambulating without any difficulty or gait abnormality       Transaminitis-resolved as of 3/16/2023  Assessment & Plan  Recent Labs     03/14/23  1150 03/15/23  0526   AST 49* 34   ALT 40 29   TBILI 1 96* 1 93*     · Patient with mildly elevated transaminases POA  · In the setting of chronic alcohol use    · Encourage cessation  · IV hydration  · Continue to monitor    * Alcohol withdrawal syndrome with complication (HCC)-resolved as of 3/16/2023  Assessment & Plan  · H/o chronic daily alcohol consumption, denies h/o withdrawal seizures  · Last drink: 3/13/23 @ 9 pm   · Ethanol lvl: 3/14/23 <3  · [facility] with symptom-triggered phenobarbital for medically-assisted alcohol withdrawal on 3/15/23   · Denies further withdrawal symptoms, has been monitored off of Protocol and does exhibit further withdrawal symptoms   · Stable from withdrawal perspective              Consultations During [facility]:  · PT    Procedures Performed:   · None    Significant Findings / Test Results:   · Hypomag- supplemented    Incidental Findings:   · None     Test Results Pending at Discharge (will require follow up): · None     Outpatient Tests Requested:  · None     Complications:  None    Reason for Admission: alcohol withdrawal     Hospital Course:     Harini Membreno is a 78 y o  female patient who originally presented to the hospital on 3/14/2023 due to acute alcohol withdrawal  Patient has a past medical history significant for alcohol use disorder, HTN, GERD, osteoprosis, anxiety, and DJD  Patient originally presented to [place] [address] with acute withdrawal symptoms and was transferred to [facility] medical detox unit for further management of withdrawal symptoms  Upon admission patient displayed withdrawal symptoms of anxiety, tachycardia, hypertension, tremors, and gait instability  She was started on SEWS protocol along with continuous pulse oximetry and telemetry monitoring  Patient received a total of 260 mg of phenobarbital  She did not require any additional phenobarbital after initial dose  Patient did also receive high dose thiamine treatment due to ataxia on admission which improved on 3/15/23  PT evaluated patient and it was determined that she did not require any walking assistant devices and can be discharge home with family support  Laboratory Studies were obtained daily and electrolyte derangements were supplemented as needed  Case management was consulted, patient is interested in outpatient resources upon discharge  Please see above list of diagnoses and related plan for additional information  Condition at Discharge: stable     Discharge Day Visit / Exam:     Subjective:  Seen and Examined at bedside this morning  Denies further withdrawal symptoms  Reports great improvement in coordination since admission  She is excited for discharge this evening     Vitals: Blood Pressure: 154/88 (03/16/23 1542)  Pulse: 102 (03/16/23 1542)  Temperature: 99 °F (37 2 °C) (03/16/23 1542)  Temp Source: Temporal (03/16/23 1542)  Respirations: 18 (03/16/23 1542)  Height: 5' 5" (165 1 cm) (03/14/23 1716)  Weight - Scale: 67 6 kg (149 lb) (03/14/23 1716)  SpO2: 100 % (03/16/23 1542)  Exam:   Physical Exam  Constitutional:       General: She is not in acute distress  Appearance: She is not diaphoretic  Eyes:      General: No scleral icterus  Pupils: Pupils are equal, round, and reactive to light  Cardiovascular:      Rate and Rhythm: Normal rate and regular rhythm  Neurological:      Mental Status: She is oriented to person, place, and time  Coordination: Coordination is intact  Gait: Gait is intact  Psychiatric:         Mood and Affect: Mood is not anxious or depressed  Discussion with Family: Discussed with patient     Discharge instructions/Information to patient and family:   See after visit summary for information provided to patient and family  Provisions for Follow-Up Care:  See after visit summary for information related to follow-up care and any pertinent home health orders  Disposition:     Home    For Discharges to [facility] SNF:   · Not Applicable to this Patient - Not Applicable to this Patient    Planned Readmission: none      Discharge Statement:  I spent 35 minutes discharging the patient  This time was spent on the day of discharge  I had direct contact with the patient on the day of discharge  Greater than 50% of the total time was spent examining patient, answering all patient questions, arranging and discussing plan of care with patient as well as directly providing post-discharge instructions  Additional time then spent on discharge activities  Discharge Medications:  See after visit summary for reconciled discharge medications provided to patient and family        ** Please Note: This note has been constructed using a voice recognition system **

## 2023-03-16 NOTE — NURSING NOTE
Discharge instructions reviewed both written and verbally with patient with details on f/u apts and medications next dose due  Denies questions  Encourage to use the [facility]  IV d/c with catheter intact  Dressed in street clothes  Having patient notify staff of [facility]

## 2023-03-16 NOTE — PLAN OF CARE
Problem: SUBSTANCE USE/ABUSE  Goal: By discharge, will develop insight into their chemical dependency and sustain motivation to continue in recovery  Description: INTERVENTIONS:  - Attends all daily group sessions and scheduled AA groups  - Actively practices coping skills through participation in the therapeutic community and adherence to program rules  - Reviews and completes assignments from individual treatment plan  - Assist patient development of understanding of their personal cycle of addiction and relapse triggers  Outcome: Progressing  Goal: By discharge, patient will have ongoing treatment plan addressing chemical dependency  Description: INTERVENTIONS:  - Assist patient with resources and/or appointments for ongoing recovery based living  Outcome: Progressing     Problem: COPING  Goal: Pt/Family able to verbalize concerns and demonstrate effective coping strategies  Description: INTERVENTIONS:  - Assist patient/family to identify coping skills, available support systems and cultural and spiritual values  - Provide emotional support, including active listening and acknowledgement of concerns of patient and caregivers  - Reduce environmental stimuli, as able  - Provide patient education  - Assess for spiritual pain/suffering and initiate spiritual care, including notification of Pastoral Care or komal based community as needed  - Assess effectiveness of coping strategies  Outcome: Progressing  Goal: Will report anxiety at manageable levels  Description: INTERVENTIONS:  - Administer medication as ordered  - Teach and encourage coping skills  - Provide emotional support  - Assess patient/family for anxiety and ability to cope  Outcome: Progressing     Problem: MOBILITY - ADULT  Goal: Maintain or return to baseline ADL function  Description: INTERVENTIONS:  -  Assess patient's ability to carry out ADLs; assess patient's baseline for ADL function and identify physical deficits which impact ability to perform ADLs (bathing, care of mouth/teeth, toileting, grooming, dressing, etc )  - Assess/evaluate cause of self-care deficits   - Assess range of motion  - Assess patient's mobility; develop plan if impaired  - Assess patient's need for assistive devices and provide as appropriate  - Encourage maximum independence but intervene and supervise when necessary  - Involve family in performance of ADLs  - Assess for home care needs following discharge   - Consider OT consult to assist with ADL evaluation and planning for discharge  - Provide patient education as appropriate  Outcome: Progressing  Goal: Maintains/Returns to pre admission functional level  Description: INTERVENTIONS:  - Perform BMAT or MOVE assessment daily    - Set and communicate daily mobility goal to care team and patient/family/caregiver     - Collaborate with rehabilitation services on mobility goals if consulted  - Out of bed for toileting  - Record patient progress and toleration of activity level   Outcome: Progressing     Problem: Prexisting or High Potential for Compromised Skin Integrity  Goal: Skin integrity is maintained or improved  Description: INTERVENTIONS:  - Identify patients at risk for skin breakdown  - Assess and monitor skin integrity  - Assess and monitor nutrition and hydration status  - Monitor labs   - Assess for incontinence   - Turn and reposition patient  - Assist with mobility/ambulation  - Relieve pressure over bony prominences  - Avoid friction and shearing  - Provide appropriate hygiene as needed including keeping skin clean and dry  - Evaluate need for skin moisturizer/barrier cream  - Collaborate with interdisciplinary team   - Patient/family teaching  - Consider wound care consult   Outcome: Progressing

## 2023-03-16 NOTE — UTILIZATION REVIEW
NOTIFICATION OF INPATIENT ADMISSION   AUTHORIZATION REQUEST   SERVICING FACILITY:   [address] [place] [address]  [address] , [facility], [address] [place] [address]  Tax ID: [id number]  NPI: [id number] ATTENDING PROVIDER:  Attending Name and NPI#: Maikel Terry Md [[id number]]  Address: [address] , [facility], [address] [place] [address]  Phone: [phone]     ADMISSION INFORMATION:  Place of Service: Inpatient [address] [facility]  [address]  Place of Service Code: 21  Inpatient Admission Date/Time: 3/14/23  5:00 PM  Discharge Date/Time: No discharge date for patient encounter  Admitting Diagnosis Code/Description:  Alcohol dependence ([facility] ) [F10 20]     UTILIZATION REVIEW CONTACT:  Jessica Guevara Utilization   Network Utilization Review Department  Phone: [phone]  Fax [phone]  Email: [email or web address]  Contact for approvals/pending authorizations, clinical reviews, and discharge  PHYSICIAN ADVISORY SERVICES:  Medical Necessity Denial & Ainm-hl-Xpkl Review  Phone: [phone]  Fax: [phone]  Email: [email or web address]

## 2023-03-20 NOTE — UTILIZATION REVIEW
NOTIFICATION OF ADMISSION DISCHARGE   This is a Notification of Discharge from [address]  Please be advised that this patient has been discharge from our facility  Below you will find the admission and discharge date and time including the patient’s disposition  UTILIZATION REVIEW CONTACT:  Donnell Moran MA  Utilization   Network Utilization Review Department  Phone: [phone] x carefully listen to the prompts  All voicemails are confidential   Email: [email or web address]     ADMISSION INFORMATION  PRESENTATION DATE: 3/14/2023  5:00 PM  OBERVATION ADMISSION DATE:   INPATIENT ADMISSION DATE: 3/14/23  5:00 PM   DISCHARGE DATE: 3/16/2023  6:43 PM   DISPOSITION:Home/Self Care    IMPORTANT INFORMATION:  Send all requests for admission clinical reviews, approved or denied determinations and any other requests to dedicated fax number below belonging to the campus where the patient is receiving treatment   List of dedicated fax numbers:  [address] DENIALS (Administrative/Medical Necessity) [phone]   [address] (Maternity/NICU/Pediatrics) [phone]   [facility] [phone]   [facility] 87 [phone]   [address] [phone]   220 [facility] [phone]   [address] [facility] [address] [phone]   14[facility] 119 [phone]   [facility]  [phone]   [facility] [facility] [phone]   412 [facility] 850 [facility] [phone]

## 2023-04-27 ENCOUNTER — TELEPHONE (OUTPATIENT)
Dept: OBGYN CLINIC | Facility: HOSPITAL | Age: 78
End: 2023-04-27

## 2023-04-27 NOTE — TELEPHONE ENCOUNTER
Caller: Kenneth Wilson    Doctor: chidi    Reason for call: Patient calling to schedule np appt for second opinion with back surgeon    Was looking to schedule in [facility]    Call back#: Shun Lazo

## 2024-06-21 ENCOUNTER — HOSPITAL ENCOUNTER (OUTPATIENT)
Dept: GASTROENTEROLOGY | Facility: HOSPITAL | Age: 79
Setting detail: OUTPATIENT SURGERY
End: 2024-06-21
Attending: HOSPITALIST
Payer: COMMERCIAL

## 2024-06-21 ENCOUNTER — ANESTHESIA EVENT (OUTPATIENT)
Dept: GASTROENTEROLOGY | Facility: HOSPITAL | Age: 79
End: 2024-06-21

## 2024-06-21 ENCOUNTER — ANESTHESIA (OUTPATIENT)
Dept: GASTROENTEROLOGY | Facility: HOSPITAL | Age: 79
End: 2024-06-21

## 2024-06-21 VITALS
DIASTOLIC BLOOD PRESSURE: 76 MMHG | TEMPERATURE: 97.7 F | RESPIRATION RATE: 29 BRPM | OXYGEN SATURATION: 98 % | HEIGHT: 65 IN | HEART RATE: 67 BPM | SYSTOLIC BLOOD PRESSURE: 141 MMHG | WEIGHT: 160 LBS | BODY MASS INDEX: 26.66 KG/M2

## 2024-06-21 DIAGNOSIS — Z86.010 PERSONAL HISTORY OF COLONIC POLYPS: ICD-10-CM

## 2024-06-21 PROBLEM — F10.11 ALCOHOL ABUSE, IN REMISSION: Status: ACTIVE | Noted: 2024-06-21

## 2024-06-21 PROCEDURE — 88305 TISSUE EXAM BY PATHOLOGIST: CPT | Performed by: SPECIALIST

## 2024-06-21 RX ORDER — SIMETHICONE 40MG/0.6ML
SUSPENSION, DROPS(FINAL DOSAGE FORM)(ML) ORAL AS NEEDED
Status: COMPLETED | OUTPATIENT
Start: 2024-06-21 | End: 2024-06-21

## 2024-06-21 RX ORDER — LIDOCAINE HYDROCHLORIDE 10 MG/ML
INJECTION, SOLUTION EPIDURAL; INFILTRATION; INTRACAUDAL; PERINEURAL AS NEEDED
Status: DISCONTINUED | OUTPATIENT
Start: 2024-06-21 | End: 2024-06-21

## 2024-06-21 RX ORDER — SODIUM CHLORIDE, SODIUM LACTATE, POTASSIUM CHLORIDE, CALCIUM CHLORIDE 600; 310; 30; 20 MG/100ML; MG/100ML; MG/100ML; MG/100ML
INJECTION, SOLUTION INTRAVENOUS CONTINUOUS PRN
Status: DISCONTINUED | OUTPATIENT
Start: 2024-06-21 | End: 2024-06-21

## 2024-06-21 RX ORDER — PROPOFOL 10 MG/ML
INJECTION, EMULSION INTRAVENOUS AS NEEDED
Status: DISCONTINUED | OUTPATIENT
Start: 2024-06-21 | End: 2024-06-21

## 2024-06-21 RX ORDER — ONDANSETRON 2 MG/ML
4 INJECTION INTRAMUSCULAR; INTRAVENOUS ONCE AS NEEDED
Status: DISCONTINUED | OUTPATIENT
Start: 2024-06-21 | End: 2024-06-21 | Stop reason: HOSPADM

## 2024-06-21 RX ADMIN — PROPOFOL 150 MCG/KG/MIN: 10 INJECTION, EMULSION INTRAVENOUS at 09:00

## 2024-06-21 RX ADMIN — LIDOCAINE HYDROCHLORIDE 50 MG: 10 INJECTION, SOLUTION EPIDURAL; INFILTRATION; INTRACAUDAL; PERINEURAL at 08:59

## 2024-06-21 RX ADMIN — Medication 40 MG: at 09:05

## 2024-06-21 RX ADMIN — SODIUM CHLORIDE, SODIUM LACTATE, POTASSIUM CHLORIDE, AND CALCIUM CHLORIDE: .6; .31; .03; .02 INJECTION, SOLUTION INTRAVENOUS at 08:53

## 2024-06-21 RX ADMIN — PROPOFOL 50 MG: 10 INJECTION, EMULSION INTRAVENOUS at 08:59

## 2024-06-21 NOTE — H&P
Procedure(s):  Colonoscopy with indication(s) of CRC screening      Endoscopy Pre-Procedure Assessment:  Prior to the procedure, the patient is identified.  The patient's history, medications, and allergies have been reviewed.  The patient is competent.  The risks and benefits of the procedure procedure and the planned sedation have been discussed with the patient.  All questions have been answered and informed consent for the procedure has been obtained.    Vitals:    06/21/24 0820   BP: 125/81   Pulse: 75   Resp: 18   Temp: (!) 97.2 °F (36.2 °C)   SpO2: 98%       Physical Exam:  Physical Exam  Eyes:      Conjunctiva/sclera: Conjunctivae normal.   Cardiovascular:      Rate and Rhythm: Normal rate.   Pulmonary:      Effort: Pulmonary effort is normal.   Abdominal:      Palpations: Abdomen is soft.   Neurological:      General: No focal deficit present.      Mental Status: She is alert.   Psychiatric:         Mood and Affect: Mood normal.                Consent:    We have discussed the procedure in detail. We reviewed risks, benefits and alternative as well as potentional complications including and not limited to medication side effect , infection, bleeding, perforation and the potential need for surgery, ICU admission, CPR, as well as the need for blood product transfusion. Patient verbalized understanding and agreement. All patient questions were answered.     After reviewed the risks and benefits, the patient is deemed in satisfactory condition to undergo the procedure.  The anesthesia plan is to use monitored anesthesia care (MAC).      06/21/24

## 2024-06-21 NOTE — ANESTHESIA POSTPROCEDURE EVALUATION
Post-Op Assessment Note    CV Status:  Stable    Pain management: adequate       Mental Status:  Alert and awake   Hydration Status:  Euvolemic   PONV Controlled:  Controlled   Airway Patency:  Patent     Post Op Vitals Reviewed: Yes    No anethesia notable event occurred.    Staff: CRNA               BP   133/64   Temp   97.5   Pulse  82   Resp   18   SpO2   98%

## 2024-06-21 NOTE — ANESTHESIA PREPROCEDURE EVALUATION
Procedure:  COLONOSCOPY    Relevant Problems   ANESTHESIA (within normal limits)      CARDIO   (+) High cholesterol   (+) Hypertension      GI/HEPATIC   (+) Gastroesophageal reflux disease without esophagitis      MUSCULOSKELETAL   (+) Degenerative joint disease (DJD) of lumbar spine      NEURO/PSYCH   (+) Anxiety and depression      Behavioral Health   (+) Alcohol abuse, in remission      Orthopedic/Musculoskeletal   (+) Chronic pain of both lower extremities   (+) Spondylolisthesis of lumbar region        Physical Exam    Airway    Mallampati score: II  TM Distance: >3 FB  Neck ROM: full     Dental    upper dentures    Cardiovascular      Pulmonary      Other Findings  post-pubertal.      Anesthesia Plan  ASA Score- 2     Anesthesia Type- IV sedation with anesthesia with ASA Monitors.         Additional Monitors:     Airway Plan:            Plan Factors-Exercise tolerance (METS): >4 METS.    Chart reviewed. EKG reviewed.  Existing labs reviewed. Patient summary reviewed.    Patient is not a current smoker.              Induction-     Postoperative Plan-         Informed Consent- Anesthetic plan and risks discussed with patient.  I personally reviewed this patient with the CRNA. Discussed and agreed on the Anesthesia Plan with the CRNA..

## 2024-06-25 PROCEDURE — 88305 TISSUE EXAM BY PATHOLOGIST: CPT | Performed by: SPECIALIST
